# Patient Record
Sex: FEMALE | Race: WHITE | Employment: OTHER | URBAN - METROPOLITAN AREA
[De-identification: names, ages, dates, MRNs, and addresses within clinical notes are randomized per-mention and may not be internally consistent; named-entity substitution may affect disease eponyms.]

---

## 2017-03-09 ENCOUNTER — HOSPITAL ENCOUNTER (OUTPATIENT)
Dept: SURGERY | Age: 72
Discharge: HOME | End: 2017-03-09
Attending: OBSTETRICS & GYNECOLOGY

## 2017-03-09 VITALS
TEMPERATURE: 98.4 F | BODY MASS INDEX: 30.42 KG/M2 | DIASTOLIC BLOOD PRESSURE: 63 MMHG | RESPIRATION RATE: 16 BRPM | HEIGHT: 64 IN | HEART RATE: 64 BPM | SYSTOLIC BLOOD PRESSURE: 114 MMHG | WEIGHT: 178.2 LBS | OXYGEN SATURATION: 96 %

## 2017-03-09 PROBLEM — N39.3 SUI (STRESS URINARY INCONTINENCE, FEMALE): Status: ACTIVE | Noted: 2017-03-09

## 2017-03-09 PROBLEM — N36.42 INTRINSIC SPHINCTER DEFICIENCY (ISD): Status: ACTIVE | Noted: 2017-03-09

## 2017-03-09 RX ORDER — ASPIRIN 81 MG/1
81 TABLET ORAL
COMMUNITY

## 2017-03-09 NOTE — PERIOP NOTES
Patient verified name, , and surgery as listed in [facility]. Need orders. Type 1B surgery, PAT assessment complete. Labs per surgeon: no orders. Labs per anesthesia protocol: none. EKG:  None per anesthesia guidelines. Hibiclens and instructions given per hospital policy. Patient provided with handouts including Guide to Surgery, Pain Management, Hand Hygiene, Blood Transfusion Education, and [place] [facility] Brochure. Patient answered medical/surgical history questions at their best of ability. All prior to admission medications documented in [facility]. Original medication prescription bottle not visualized during patient appointment. Patient instructed to hold all vitamins 7 days prior to surgery and NSAIDS 5 days prior to surgery, patient verbalized understanding. Medications to be held : none. Patient instructed to continue previous medications as prescribed prior to surgery and to take the following medications the day of surgery according to anesthesia guidelines with a small sip of water: atenolol, omeprazole, venlafaxine. Patient taught back and verbalized understanding.

## 2017-03-14 ENCOUNTER — ANESTHESIA EVENT (OUTPATIENT)
Dept: SURGERY | Age: 72
End: 2017-03-14
Payer: MEDICARE

## 2017-03-15 ENCOUNTER — SURGERY (OUTPATIENT)
Age: 72
End: 2017-03-15

## 2017-03-15 ENCOUNTER — ANESTHESIA (OUTPATIENT)
Dept: SURGERY | Age: 72
End: 2017-03-15
Payer: MEDICARE

## 2017-03-15 PROCEDURE — 74011250636 HC RX REV CODE- 250/636: Performed by: OBSTETRICS & GYNECOLOGY

## 2017-03-15 PROCEDURE — 74011250636 HC RX REV CODE- 250/636

## 2017-03-15 RX ORDER — FENTANYL CITRATE 50 UG/ML
INJECTION, SOLUTION INTRAMUSCULAR; INTRAVENOUS AS NEEDED
Status: DISCONTINUED | OUTPATIENT
Start: 2017-03-15 | End: 2017-03-15 | Stop reason: HOSPADM

## 2017-03-15 RX ORDER — PROPOFOL 10 MG/ML
INJECTION, EMULSION INTRAVENOUS
Status: DISCONTINUED | OUTPATIENT
Start: 2017-03-15 | End: 2017-03-15 | Stop reason: HOSPADM

## 2017-03-15 RX ORDER — PROPOFOL 10 MG/ML
INJECTION, EMULSION INTRAVENOUS AS NEEDED
Status: DISCONTINUED | OUTPATIENT
Start: 2017-03-15 | End: 2017-03-15 | Stop reason: HOSPADM

## 2017-03-15 RX ADMIN — FENTANYL CITRATE 100 MCG: 50 INJECTION, SOLUTION INTRAMUSCULAR; INTRAVENOUS at 07:20

## 2017-03-15 RX ADMIN — PROPOFOL 50 MCG/KG/MIN: 10 INJECTION, EMULSION INTRAVENOUS at 07:36

## 2017-03-15 RX ADMIN — ROPIVACAINE HYDROCHLORIDE 20 ML: 5 INJECTION, SOLUTION EPIDURAL; INFILTRATION; PERINEURAL at 07:46

## 2017-03-15 RX ADMIN — CEFAZOLIN 2 G: 1 INJECTION, POWDER, FOR SOLUTION INTRAMUSCULAR; INTRAVENOUS; PARENTERAL at 07:20

## 2017-03-15 RX ADMIN — PROPOFOL 50 MG: 10 INJECTION, EMULSION INTRAVENOUS at 07:32

## 2017-03-15 RX ADMIN — EPINEPHRINE 0.15 MG: 1 INJECTION, SOLUTION INTRAMUSCULAR; SUBCUTANEOUS at 07:46

## 2017-03-15 RX ADMIN — LIDOCAINE HYDROCHLORIDE 30 ML: 10 INJECTION, SOLUTION INFILTRATION; PERINEURAL at 07:47

## 2017-03-15 NOTE — ANESTHESIA POSTPROCEDURE EVALUATION
Post-Anesthesia Evaluation and Assessment    Patient: Lola Guajardo MRN: [id number]  SSN: [id number]    YOB: 1945  Age: 73 y.o. Sex: female       Cardiovascular Function/Vital Signs  Visit Vitals    /64    Pulse (!) 59    Temp 36.4 °C (97.6 °F)    Resp 15    SpO2 96%       Patient is status post MAC anesthesia for Procedure(s):  SUBURETHRAL SLING ADVANTAGE FIT. Nausea/Vomiting: None    Postoperative hydration reviewed and adequate. Pain:  Pain Scale 1: Numeric (0 - 10) (03/15/17 0841)  Pain Intensity 1: 0 (03/15/17 0841)   Managed    Neurological Status:   Neuro (WDL): Within Defined Limits (03/15/17 0841)  Neuro  Neurologic State: Drowsy (03/15/17 0841)  Orientation Level: Oriented X4 (03/15/17 0841)  Speech: Clear (03/15/17 0841)  LUE Motor Response: Purposeful (03/15/17 0806)  LLE Motor Response: Purposeful (03/15/17 0806)  RUE Motor Response: Purposeful (03/15/17 0806)  RLE Motor Response: Purposeful (03/15/17 0806)   At baseline    Mental Status and Level of Consciousness: Arousable    Pulmonary Status:   O2 Device: Room air (03/15/17 0841)   Adequate oxygenation and airway patent    Complications related to anesthesia: None    Post-anesthesia assessment completed.  No concerns    Signed By: Phi Vincent MD     March 15, 2017

## 2017-03-15 NOTE — ANESTHESIA PREPROCEDURE EVALUATION
Anesthetic History     PONV          Review of Systems / Medical History  Patient summary reviewed and pertinent labs reviewed    Pulmonary                   Neuro/Psych              Cardiovascular    Hypertension: well controlled        Dysrhythmias : SVT      Exercise tolerance: >4 METS     GI/Hepatic/Renal     GERD           Endo/Other        Morbid obesity and arthritis     Other Findings              Physical Exam    Airway  Mallampati: II  TM Distance: 4 - 6 cm  Neck ROM: normal range of motion   Mouth opening: Diminished (comment)     Cardiovascular    Rhythm: regular           Dental  No notable dental hx       Pulmonary                 Abdominal  GI exam deferred       Other Findings            Anesthetic Plan    ASA: 2  Anesthesia type: general          Induction: Intravenous  Anesthetic plan and risks discussed with: Patient

## 2017-05-15 ENCOUNTER — ANESTHESIA EVENT (OUTPATIENT)
Dept: ENDOSCOPY | Age: 72
End: 2017-05-15
Payer: MEDICARE

## 2017-05-15 RX ORDER — SODIUM CHLORIDE 0.9 % (FLUSH) 0.9 %
5-10 SYRINGE (ML) INJECTION AS NEEDED
Status: CANCELLED | OUTPATIENT
Start: 2017-05-15

## 2017-05-15 RX ORDER — SODIUM CHLORIDE, SODIUM LACTATE, POTASSIUM CHLORIDE, CALCIUM CHLORIDE 600; 310; 30; 20 MG/100ML; MG/100ML; MG/100ML; MG/100ML
100 INJECTION, SOLUTION INTRAVENOUS CONTINUOUS
Status: CANCELLED | OUTPATIENT
Start: 2017-05-15

## 2017-05-16 ENCOUNTER — HOSPITAL ENCOUNTER (OUTPATIENT)
Age: 72
Setting detail: OUTPATIENT SURGERY
Discharge: HOME | End: 2017-05-16
Attending: COLON & RECTAL SURGERY | Admitting: COLON & RECTAL SURGERY
Payer: MEDICARE

## 2017-05-16 ENCOUNTER — ANESTHESIA (OUTPATIENT)
Dept: ENDOSCOPY | Age: 72
End: 2017-05-16
Payer: MEDICARE

## 2017-05-16 VITALS
TEMPERATURE: 96.8 F | OXYGEN SATURATION: 96 % | DIASTOLIC BLOOD PRESSURE: 75 MMHG | RESPIRATION RATE: 18 BRPM | HEIGHT: 64 IN | SYSTOLIC BLOOD PRESSURE: 114 MMHG | BODY MASS INDEX: 29.88 KG/M2 | WEIGHT: 175 LBS | HEART RATE: 50 BPM

## 2017-05-16 PROCEDURE — 76040000025: Performed by: COLON & RECTAL SURGERY

## 2017-05-16 PROCEDURE — 74011000250 HC RX REV CODE- 250

## 2017-05-16 PROCEDURE — 76060000032 HC ANESTHESIA 0.5 TO 1 HR: Performed by: COLON & RECTAL SURGERY

## 2017-05-16 PROCEDURE — 74011250636 HC RX REV CODE- 250/636

## 2017-05-16 PROCEDURE — 74011250636 HC RX REV CODE- 250/636: Performed by: ANESTHESIOLOGY

## 2017-05-16 RX ORDER — PROPOFOL 10 MG/ML
INJECTION, EMULSION INTRAVENOUS AS NEEDED
Status: DISCONTINUED | OUTPATIENT
Start: 2017-05-16 | End: 2017-05-16 | Stop reason: HOSPADM

## 2017-05-16 RX ORDER — PROPOFOL 10 MG/ML
INJECTION, EMULSION INTRAVENOUS
Status: DISCONTINUED | OUTPATIENT
Start: 2017-05-16 | End: 2017-05-16 | Stop reason: HOSPADM

## 2017-05-16 RX ORDER — SODIUM CHLORIDE, SODIUM LACTATE, POTASSIUM CHLORIDE, CALCIUM CHLORIDE 600; 310; 30; 20 MG/100ML; MG/100ML; MG/100ML; MG/100ML
100 INJECTION, SOLUTION INTRAVENOUS CONTINUOUS
Status: DISCONTINUED | OUTPATIENT
Start: 2017-05-16 | End: 2017-05-16 | Stop reason: HOSPADM

## 2017-05-16 RX ORDER — LIDOCAINE HYDROCHLORIDE 20 MG/ML
INJECTION, SOLUTION EPIDURAL; INFILTRATION; INTRACAUDAL; PERINEURAL AS NEEDED
Status: DISCONTINUED | OUTPATIENT
Start: 2017-05-16 | End: 2017-05-16 | Stop reason: HOSPADM

## 2017-05-16 RX ADMIN — LIDOCAINE HYDROCHLORIDE 40 MG: 20 INJECTION, SOLUTION EPIDURAL; INFILTRATION; INTRACAUDAL; PERINEURAL at 12:12

## 2017-05-16 RX ADMIN — PROPOFOL 160 MCG/KG/MIN: 10 INJECTION, EMULSION INTRAVENOUS at 12:12

## 2017-05-16 RX ADMIN — PROPOFOL 50 MG: 10 INJECTION, EMULSION INTRAVENOUS at 12:12

## 2017-05-16 RX ADMIN — SODIUM CHLORIDE, SODIUM LACTATE, POTASSIUM CHLORIDE, AND CALCIUM CHLORIDE 100 ML/HR: 600; 310; 30; 20 INJECTION, SOLUTION INTRAVENOUS at 11:25

## 2017-05-16 NOTE — ROUTINE PROCESS
Patient discharged via wheelchair. VSS on room air. No complaints of pain or discomfort. Spoke with Dr. Alber Joy at bedside. Anesthesia aware of discharge. Discharge instructions given to patient and family.

## 2017-05-16 NOTE — PROCEDURES
Colonoscopy Procedure Note    Wilhemina Castleman  1945  [id number]    Indications:  Screening colonoscopy, Personal history of colon polyps     Pre-operative Diagnosis: Screening colonoscopy, Personal history of colon polyps    Post-operative Diagnosis: Diverticulosis, rectocele    Surgeon: Riri Ann MD    Referring Provider: James Mccray MD    Sedation:  MAC anesthesia Propofol    Pre-Procedure Exam:  Airway: clear   Heart: normal S1and S2    Lungs: clear bilateral  Abdomen: soft, nontender, bowel sounds present and normal in all quadrants   Mental Status: awake, alert, and oriented to person, place, and time    Procedure in Detail:  Informed consent was obtained for the procedure after delineating the risks, benefits, and alternatives to the procedure. Specific risks of perforation (1/1000), hemorrhage (1/1000), missed lesions, adverse drug reaction, and aspiration were discussed. The patient was placed in the left lateral decubitus position. Based on the pre-procedure assessment, including review of the patient's medical history, medications, and allergies, moderate sedation was felt to be appropriate. The aforementioned medications were given in an incremental fashion to achieve adequate sedation. The patient was monitored continuously with ECG tracing, pulse oximetry, blood pressure monitoring, and direct observations. A rectal examination was performed and showed no external hemorrhoids. Small rectocele. The Dharmesh Do Arenque 1634 was inserted per anus and advanced under direct vision to the cecum, which was identified by the ileocecal valve and appendiceal orifice. The quality of the colonic preparation was good. The colonoscope was slowly withdrawn using a to-and-fro and circumferential motion over 15 minutes with careful examination between folds. Retroflexion was performed in the rectum. Appropriate photodocumentation was obtained.      Findings:   Rectum: Mild incidental internal hemorrhoids. No polyps, masses, bleeding, or mucosal abnormalities. Sigmoid: Rare incidental diverticulosis. No polyps, masses, bleeding, or mucosal abnormalities. Descending Colon: Normal.  No polyps, masses, bleeding, or mucosal abnormalities. Transverse Colon: Normal. No polyps, masses, bleeding, or mucosal abnormalities. Ascending Colon: Normal.  No polyps, masses, bleeding, or mucosal abnormalities. Cecum: Normal.  No polyps, masses, bleeding, or mucosal abnormalities. Terminal Ileum: not intubated    Therapies:  none    Implants: None    Specimen:  none    Complications: None; patient tolerated the procedure well. EBL:  0    Recommendations:   -Repeat colonoscopy in 5 years due to history of polyps.   -High fiber diet.    -Follow up with Dr Carly Tineo in the office on an as-needed basis    Jose Mireles MD  5/16/2017  12:42 PM

## 2017-05-16 NOTE — DISCHARGE INSTRUCTIONS
Gastrointestinal Colonoscopy/Flexible Sigmoidoscopy - Lower Exam Discharge Instructions  1. Call Dr. Rochelle Armenta at [phone] for any problems or questions. 2. Contact the doctors office for follow up appointment as directed  3. Medication may cause drowsiness for several hours, therefore, do not drive or operate machinery for remainder of the day. 4. No alcohol today. 5. Ordinarily, you may resume regular diet and activity after exam unless otherwise specified by your physician. 6. Because of air put into your colon during exam, you may experience some abdominal distension, relieved by the passage of gas, for several hours. 7. Contact your physician if you have any of the following:  a. Excessive amount of bleeding - large amount when having a bowel movement. Occasional specks of blood with bowel movement would not be unusual.  b. Severe abdominal pain  c. Fever or Chills    Any additional instructions:    -Follow up with Dr Rochelle Armenta in the office on an as-needed basis. -High fiber diet  -Repeat colonoscopy in 5 years due to history of polyps      Instructions given to Valerie Gaspar and other family members.   Instructions given by: Susu Gaona MD

## 2017-05-16 NOTE — ANESTHESIA POSTPROCEDURE EVALUATION
Post-Anesthesia Evaluation and Assessment    Patient: Kenisha Becker MRN: [id number]  SSN: [id number]    YOB: 1945  Age: 73 y.o. Sex: female       Cardiovascular Function/Vital Signs  Visit Vitals    /75    Pulse (!) 50    Temp 36 °C (96.8 °F)    Resp 18    Ht 5' 4\" (1.626 m)    Wt 79.4 kg (175 lb)    SpO2 96%    Breastfeeding No    BMI 30.04 kg/m2       Patient is status post total IV anesthesia anesthesia for Procedure(s):  COLONOSCOPY / BMI= 31. Nausea/Vomiting: None    Postoperative hydration reviewed and adequate. Pain:  Pain Scale 1: Numeric (0 - 10) (05/16/17 1314)  Pain Intensity 1: 0 (05/16/17 1314)   Managed    Neurological Status: At baseline    Mental Status and Level of Consciousness: Awake, alert    Pulmonary Status:   O2 Device: Room air (05/16/17 1314)   Adequate oxygenation and airway patent    Complications related to anesthesia: None    Post-anesthesia assessment completed.  No concerns    Signed By: Kashif Golden MD     May 16, 2017

## 2017-05-16 NOTE — IP AVS SNAPSHOT
[address] 
[phone] Patient: Brooke Kelly MRN: [id number] HPC:[id number]/50/[id number] You are allergic to the following Allergen Reactions Cortisone Other (comments) Per Pt, develops flushing after taking cortisone. Recent Documentation Height Weight Breastfeeding? BMI OB Status Smoking Status 1.626 m 79.4 kg No 30.04 kg/m2 Hysterectomy Former Smoker Emergency Contacts Name Discharge Info Relation Home Work Mobile [facility] DISCHAR[facility]AREGIVER [3] Spouse [3] [phone] About your hospitalization You were admitted on:  May 16, 2017 You last received care in the:  [facility] You were discharged on:  May 16, 2017 Unit phone number:  [phone] Why you were hospitalized Your primary diagnosis was:  Not on File Providers Seen During Your Hospitalizations Provider Role Specialty Primary office phone Geo Izaguirre MD Attending Provider Colon and Rectal Surgery [phone] Your Primary Care Physician (PCP) Primary Care Physician Office Phone Office Fax [facility], [address] [place] [address] [phone] Follow-up Information Follow up With Details Comments Contact Info Gilda Schulz MD   [address] [place] [id number] [phone] Current Discharge Medication List  
  
CONTINUE these medications which have CHANGED Dose & Instructions Dispensing Information Comments Morning Noon Evening Bedtime  
 atenolol 25 mg tablet Commonly known as:  TENORMIN What changed:  when to take this Your last dose was: Your next dose is:    
   
   
 Dose:  25 mg Take 1 Tab by mouth daily. Indications: PAROXYSMAL SUPRAVENTRICULAR TACHYCARDIA, SVT Quantity:  90 Tab Refills:  3 Omeprazole delayed release 20 mg tablet Commonly known as:  PRILOSEC D/R What changed:  when to take this Your last dose was: Your next dose is:    
   
   
 Dose:  20 mg Take 1 Tab by mouth daily. Quantity:  90 Tab Refills:  3  
     
   
   
   
  
 venlafaxine 50 mg tablet Commonly known as:  [facility] What changed:  when to take this Your last dose was: Your next dose is:    
   
   
 Dose:  50 mg Take 1 Tab by mouth daily. Indications: GENERALIZED ANXIETY DISORDER Quantity:  90 Tab Refills:  3 CONTINUE these medications which have NOT CHANGED Dose & Instructions Dispensing Information Comments Morning Noon Evening Bedtime  
 aspirin delayed-release 81 mg tablet Your last dose was: Your next dose is:    
   
   
 Dose:  81 mg Take 81 mg by mouth every morning. Refills:  0 Discharge Instructions Gastrointestinal Colonoscopy/Flexible Sigmoidoscopy - Lower Exam Discharge Instructions 1. Call Dr. Thu Stern at [phone] for any problems or questions. 2. Contact the doctors office for follow up appointment as directed 3. Medication may cause drowsiness for several hours, therefore, do not drive or operate machinery for remainder of the day. 4. No alcohol today. 5. Ordinarily, you may resume regular diet and activity after exam unless otherwise specified by your physician. 6. Because of air put into your colon during exam, you may experience some abdominal distension, relieved by the passage of gas, for several hours. 7. Contact your physician if you have any of the following: 
a. Excessive amount of bleeding  large amount when having a bowel movement. Occasional specks of blood with bowel movement would not be unusual. 
b. Severe abdominal pain 
c. Fever or Chills Any additional instructions:   
-Follow up with Dr Thu Stern in the office on an as-needed basis. -High fiber diet -Repeat colonoscopy in 5 years due to history of polyps Instructions given to Pamella Courser and other family members. Instructions given by: Shabana Barnes MD 
 
 
 
 
Discharge Orders None ACO Transitions of Care Introducing [employer] [address] offers a voluntary care coordination program to provide high quality service and care to [facility] fee-for-service beneficiaries. Vicente Delgado was designed to help you enhance your health and well-being through the following services: ? Transitions of Care  support for individuals who are transitioning from one care setting to another (example: Hospital to home). ? Chronic and Complex Care Coordination  support for individuals and caregivers of those with serious or chronic illnesses or with more than one chronic (ongoing) condition and those who take a number of different medications. If you meet specific medical criteria, a [facility] [address] may call you directly to coordinate your care with your primary care physician and your other care providers. For questions about the [facility] programs, please, contact your physicians office. For general questions or additional information about Accountable Care Organizations: 
Please visit www.medicare.gov/acos. html or call 1-800-MEDICARE (3-876.566.4490) TTY users should call 3-197.290.2734. Introducing hospitals & HEALTH SERVICES! Henrry Gonzalez introduces [employer] patient portal. Now you can access parts of your medical record, email your doctor's office, and request medication refills online. 1. In your internet browser, go to [email or web address] 2. Click on the First Time User? Click Here link in the Sign In box. You will see the New Member Sign Up page. 3. Enter your [employer] Access Code exactly as it appears below.  You will not need to use this code after youve completed the sign-up process. If you do not sign up before the expiration date, you must request a new code. · [employer] Access Code: [email or web address] Expires: 6/4/2017 10:11 AM 
 
4. Enter the last four digits of your Social Security Number (xxxx) and Date of Birth (mm/dd/yyyy) as indicated and click Submit. You will be taken to the next sign-up page. 5. Create a [employer] ID. This will be your [employer] login ID and cannot be changed, so think of one that is secure and easy to remember. 6. Create a [employer] password. You can change your password at any time. 7. Enter your Password Reset Question and Answer. This can be used at a later time if you forget your password. 8. Enter your e-mail address. You will receive e-mail notification when new information is available in [address] 9. Click Sign Up. You can now view and download portions of your medical record. 10. Click the Download Summary menu link to download a portable copy of your medical information. If you have questions, please visit the Frequently Asked Questions section of the [employer] website. Remember, [employer] is NOT to be used for urgent needs. For medical emergencies, dial 911. Now available from your iPhone and Android! General Information Please provide this summary of care documentation to your next provider. Patient Signature:  ____________________________________________________________ Date:  ____________________________________________________________  
  
Jericho Rucker Provider Signature:  ____________________________________________________________ Date:  ____________________________________________________________

## 2017-05-16 NOTE — ANESTHESIA PREPROCEDURE EVALUATION
Anesthetic History     PONV          Review of Systems / Medical History  Patient summary reviewed and pertinent labs reviewed    Pulmonary                   Neuro/Psych              Cardiovascular    Hypertension: well controlled        Dysrhythmias : SVT      Exercise tolerance: >4 METS     GI/Hepatic/Renal     GERD           Endo/Other        Morbid obesity and arthritis     Other Findings              Physical Exam    Airway  Mallampati: II  TM Distance: 4 - 6 cm  Neck ROM: normal range of motion   Mouth opening: Diminished (comment)     Cardiovascular    Rhythm: regular  Rate: normal         Dental  No notable dental hx       Pulmonary                 Abdominal  GI exam deferred       Other Findings            Anesthetic Plan    ASA: 2  Anesthesia type: total IV anesthesia          Induction: Intravenous  Anesthetic plan and risks discussed with: Patient

## 2018-02-21 PROBLEM — R73.01 IFG (IMPAIRED FASTING GLUCOSE): Status: ACTIVE | Noted: 2018-02-21

## 2018-03-06 PROBLEM — E78.5 HYPERLIPIDEMIA LDL GOAL <100: Status: ACTIVE | Noted: 2018-03-06

## 2018-03-09 ENCOUNTER — HOSPITAL ENCOUNTER (OUTPATIENT)
Dept: MAMMOGRAPHY | Age: 73
Discharge: HOME | End: 2018-03-09
Attending: FAMILY MEDICINE
Payer: MEDICARE

## 2018-03-09 DIAGNOSIS — Z78.0 MENOPAUSE: ICD-10-CM

## 2018-03-09 PROCEDURE — 77080 DXA BONE DENSITY AXIAL: CPT

## 2018-03-12 PROBLEM — M85.80 OSTEOPENIA DETERMINED BY X-RAY: Status: ACTIVE | Noted: 2018-03-12

## 2018-03-12 NOTE — PROGRESS NOTES
Dear Mrs. Christa Bennett     Your recent Dexa scan show Osteopenia. Recomendations are :  -OTC Vitamin D 1000 international units daily and continue your weekly Rx. of Vitamin D3 if you were previously on.  -Daily oral intake of calcium : 600 mg to  u1200 mg day. - Aerobic exercise with goal of 150 minutes a week  - Twice a week weight bearing exercises  -Eat healthy with 3-5 servings daily of fruit and vegetables and low fat or fat free products. Follow up next year for your preventive physical exam and repeat Dexa scan in 2 years.     Thanks,  Dr. Kolton Gonzalez

## 2019-05-20 PROBLEM — Z00.00 ANNUAL PHYSICAL EXAM: Status: ACTIVE | Noted: 2019-05-20

## 2020-05-21 ENCOUNTER — HOSPITAL ENCOUNTER (OUTPATIENT)
Dept: PHYSICAL THERAPY | Age: 75
Discharge: HOME | End: 2020-05-21
Attending: OBSTETRICS & GYNECOLOGY
Payer: MEDICARE

## 2020-05-21 DIAGNOSIS — N39.3 SUI (STRESS URINARY INCONTINENCE, FEMALE): ICD-10-CM

## 2020-05-21 PROCEDURE — 97110 THERAPEUTIC EXERCISES: CPT

## 2020-05-21 PROCEDURE — 97161 PT EVAL LOW COMPLEX 20 MIN: CPT

## 2020-05-21 NOTE — THERAPY EVALUATION
Harley Pop : 1945 [place] Secondary: David Aguilar at [facility] [address], [place] [address] [phone]   [phone] OUTPATIENT PHYSICAL THERAPY:Initial Assessment 2020 ICD-10: Treatment Diagnosis: N39.3 Stress incontinence (female) (male), R27.8 Lack of coordination (muscle incoordination), N39.41 Urge incontinence, R35.0 Frequency of micturition, M62.81 Generalized weakness Precautions/Allergies:  
Cortisone TREATMENT PLAN: 
Effective Dates: 2020 TO 2020 (90 days). Frequency/Duration: 1 time a week for 90 Day(s) MEDICAL/REFERRING DIAGNOSIS: 
HALEY (stress urinary incontinence, female) [N39.3] DATE OF ONSET: chronic REFERRING PHYSICIAN: Jon Severe, DO 
MD Orders: Evaluate and treat Return MD Appointment: 8/3/2020 INITIAL ASSESSMENT:  Ms. Alicia Conner presents with pelvic floor muscle (PFM) lack of coordination, timing, awareness, endurance and strength. She is unable to pair a PFM contraction with normal breathing and without utilizing accessory muscles, contributing to stress urinary incontinence (HALEY). She also has some mild bladder health habits that may be worsening her symptoms and contributing to urinary urgency and frequency. She will benefit from skilled PT with an emphasis on PFM retraining, core retraining, body mechanics and bladder health education to improve her daytime UI. PROBLEM LIST (Impacting functional limitations): 1. Decreased Strength 2. Decreased ADL/Functional Activities 3. Decreased [place] with Home Exercise Program 
4. Decreased strength of pelvic floor which limits bladder control INTERVENTIONS PLANNED: (Treatment may consist of any combination of the following) 1. Neuromuscular re-education 2. Biofeedback as needed 3. HEP 4. Bladder retraining 5. Bladder education 6. Home Exercise Program (HEP) 7. Manual Therapy 8. Neuromuscular Re-education/Strengthening 9. Therapeutic Activites 10. Therapeutic Exercise/Strengthening GOALS: (Goals have been discussed and agreed upon with patient.) Short-Term Functional Goals: Time Frame: 6 weeks 1. Pt will demonstrate I with basic PFM HEP to improve awareness, coordination, and timing of PFM. 2. Pt will be able to isolated PFM contraction without compensation. 3. Pt will increase water intake by 16 oz and decrease irritant consumption by 8 oz to improve bladder health. Discharge Goals: Time Frame: 12 weeks 1. Pt will demonstrate appropriate use of the pelvic floor muscle group (quick flicks and/or drops), without compensation, to implement urge suppression appropriately with urgency of urination and decrease the number of pads used per day. 2. Pt will be able to demonstrate appropriate use of 'the knack' in order to improve PFM coordination in order to reduce/eliminate stress urinary incontinence during coughing, laughing and sneezing. 3. Pt will demonstrate appropriate co-contraction of the transversus abdominus and pelvic floor muscle group, and maintain 8 seconds to improve core stability and improve management of UI during daily tasks. OUTCOME MEASURE:  
Tool Used: Pelvic Floor Impact Questionnaireshort form 7 (PFIQ-7) Score:  Initial: · Bladder or Urine: 24 · Bowel or Rectum: 19 · Vagina or Pelvis: 14 Most Recent: X (Date: -- ) · Bladder or Urine: X 
· Bowel or Rectum: X · Vagina or Pelvis: X Interpretation of Score: Each of the 7 sections is scored on a scale from 0-3; 0 representing \"Not at all\", 3 representing \"Quite a bit\". The mean value is taken from all the answered items, then multiplied by 100 to obtain the scale score, ranging from 0-100. This process is repeated for each column representing bowel, bladder, and pelvic pain.  
 
MEDICAL NECESSITY:  
· Patient is expected to demonstrate progress in strength, range of motion, coordination and functional technique to decrease urinary leakage. REASON FOR SERVICES/OTHER COMMENTS: 
· Patient continues to require skilled intervention due to above mentioned deficits. Total Duration: PT Patient Time In/Time Out Time In: 1300 Time Out: 1400 Rehabilitation Potential For Stated Goals: Good Regarding Aruna Sibley therapy, I certify that the treatment plan above will be carried out by a therapist or under their direction. Thank you for this referral, Mary Jo Gloria, PT, DPT     
 
PAIN/SUBJECTIVE:  
Initial: Pain Intensity 1: 0  Post Session:  0/10 HISTORY:  
History of Injury/Illness (Reason for Referral): Ms. Maurice Arteaga is a 70 yo female referred to pelvic floor physical therapy (PFPT) by Kayode Alves, DO 2/2 stress urinary incontinence. Pt reports that symptoms are chronic and this has been going on for many years, however has worsened more recently. She has been told about kegels, but states that she doesn't remember to do them. She has had 2-3 UTIs over the last year. Previous treatment includes surgery and medications. Urinary: Frequency 8-10 x/day, 0-1 x/night. Positive for stress urinary incontinence, urge urinary incontinence and urinary frequency. Negative for urinary urgency, incomplete emptying, urinary hesitancy/intermittency, dysuria, hematuria and nocturia. Pt does use pads for protection; 1-2 pads per day (PPD). Fluid intake: 8-16 oz water/day; bladder irritants include: 2c coffee, 16-20oz diet coke, alcohol. Pt does not limit fluid intake due to bladder control. Bowel: Frequency 1-2x/day. Negative for pain with bowel movement, pushing/straining with bowel movement, fecal incontinence, constipation and incomplete emptying. Pt does not use pads for protection; 0 pads per day (PPD). [place] stool type: 4 and 5. Use of stool softeners or laxatives? NO Sexual: Pt is not sexually active due to husbands health. Pelvic Organ Prolapse/Pelvic Pain: Denies pain complaints. Denies pelvic pressure/heaviness. OB History: Pregnancies: 3  Vaginal delivery: 3 Past Medical History/Comorbidities:  
Ms. Renny Arreola  has a past medical history of Anxiety, Arthritis, Cancer ([facility] [address] Depression with anxiety, Diverticulosis (2017), Former smoker, GERD (gastroesophageal reflux disease), H/O bone density study (2015), H/O colonoscopy (3/15/11), History of breast cancer (1995), colonic polyps, Hyperlipidemia LDL goal <100 (3/6/2018), Hypertension, IFG (impaired fasting glucose) (2/21/2018), Nausea & vomiting, Osteopenia determined by x-ray (3/12/2018), SVT (supraventricular tachycardia) ([facility] [address] and Vitamin D deficiency (12/5/2016). She also has no past medical history of Adverse effect of anesthesia, Difficult intubation, Malignant hyperthermia due to anesthesia, or Pseudocholinesterase deficiency. Ms. Renny Arreola  has a past surgical history that includes hx skin biopsy (2016); hx cholecystectomy (2012); hx tubal ligation (1983); hx knee replacement (Bilateral, 2009 (R), 2014 (L)); hx mastectomy (Bilateral, 1995); hx [place]rological [place] [facility] pr colonoscopy flx dx w/collj spec when pfrmd (05/16/2017); colonoscopy (N/A, 5/16/2017); and hx total vaginal hysterectomy (2000). Social History/Living Environment:  
Pt lives with her . Alcohol use? Ocassional 
Tobacco use? No 
Prior Level of Function/Work/Activity: 
Prior level of function: independent with urinary leakage Occupation: Retired Exercises activities: golf 2x/week Ambulatory/Rehab Services H2 Model Falls Risk Assessment Risk Factors: 
     No Risk Factors Identified Ability to Rise from Chair: 
     (0)  Ability to rise in a single movement Falls Prevention Plan: No modifications necessary Total: (5 or greater = High Risk): 0  
©2010 [employer] States Patent #[id number]. Federal Law prohibits the replication, distribution or use without written permission from [facility] Current Medications:  
  
Current Outpatient Medications:  
  calcium acetate,phosphat bind, (PHOSLO) 667 mg cap, Take 1 Cap by mouth three (3) times daily (with meals). , Disp: , Rfl:  
  cholecalciferol (Vitamin D3) 25 mcg (1,000 unit) cap, Take 1,000 Units by mouth daily. , Disp: , Rfl:  
  venlafaxine (EFFEXOR) 50 mg tablet, Take 1 Tab by mouth every morning. Indications: repeated episodes of anxiety, Disp: 90 Tab, Rfl: 3 
  metFORMIN (GLUCOPHAGE) 500 mg tablet, Take 0.5 Tabs by mouth daily (with dinner). Indications: prevention of type 2 diabetes mellitus, Disp: 90 Tab, Rfl: 3 
  atenolol (TENORMIN) 25 mg tablet, Take 1 Tab by mouth daily. Indications: Paroxysmal Supraventricular Tachycardia, SVT, Disp: 90 Tab, Rfl: 3 
  Omeprazole delayed release (PRILOSEC D/R) 20 mg tablet, Take 1 Tab by mouth daily. Indications: gastroesophageal reflux disease, Disp: 90 Tab, Rfl: 3 
  aspirin delayed-release 81 mg tablet, Take 81 mg by mouth every morning., Disp: , Rfl:   
Date Last Reviewed: 5/21/2020 Number of Personal Factors/Comorbidities that affect the Plan of Care: 0: LOW COMPLEXITY EXAMINATION:  
External Observations: ? Voluntary contraction: [] absent     [x] present ? Involuntary contraction: [x] absent     [] present ? Involuntary relaxation: [x] absent     [] present ? Perineal descent at rest: [x] absent     [] present ? Perineal descent with bearing: [x] absent     [] present 
? Skin integrity: atrophy present Pelvic Floor Muscle (PFM) Assessment: ? Vaginal vault size: [] decreased     [] increased     [x] WNL ? Muscle volume: [x] decreased     [] WNL     [] Defect ? PFM tone: [x] decreased     [] increased     [] WNL Contraction ability: 
Voluntary contraction: [] absent     [x] weak     [] moderate      [] strong Voluntary relaxation: [] absent     [x] partial     [] complete MMT: 2/5 PFM endurance: 5 seconds Tissue laxity test: Anterior wall: [] minimum     [x] moderate     [] severe      [] WNL Posterior wall: [x] minimum     [] moderate     [] severe      [] WNL Palpation: via vaginal canal 
Superficial Pelvic Floor Musculature (PFM): Tender? Intermediate PFM Tender? Deep PFM Tender? R Superficial Transverse Perineal No R Deep Transverse Perineal No R. Puborectalis No  
L Superficial Transverse Perineal No L Deep Transverse Perineal No L. Puborectalis No  
R Ischiocavernosus No R Compressor Urethra No R. Pubococcygeus No  
L Ischiocavernosus No L Compressor Urethra No L. Pubococcygeus No  
R Bulbocavernosus No   R. Ileococcygeus No  
L Bulbocavernosus No   L. Ileococcygeus No  
    R. Obturator Internus Not tested L. Obturator Internus Not tested R. Coccygeus Not tested L. Coccygeus Not tested Body Structures Involved: 1. Nerves 2. Muscles Body Functions Affected: 1. Genitourinary 2. Neuromusculoskeletal 
3. Movement Related Activities and Participation Affected: 1. Learning and Applying Knowledge 2. General Tasks and Demands 3. Mobility 4. Self Care Number of elements (examined above) that affect the Plan of Care: 1-2: LOW COMPLEXITY CLINICAL PRESENTATION:  
Presentation: Stable and uncomplicated: LOW COMPLEXITY CLINICAL DECISION MAKING:  
Use of outcome tool(s) and clinical judgement create a POC that gives a: Clear prediction of patient's progress: LOW COMPLEXITY

## 2020-05-21 NOTE — PROGRESS NOTES
Sameer Sullivan  : 1945  [place]  Secondary: David Aguilar at [facility], [address], [place] [address]  [phone]   [phone]      OUTPATIENT PHYSICAL THERAPY: Daily Treatment Note 2020   Visit Count:  1  ICD-10: Treatment Diagnosis: N39.3 Stress incontinence (female) (male), R27.8 Lack of coordination (muscle incoordination), N39.41 Urge incontinence, R35.0 Frequency of micturition, M62.81 Generalized weakness  Precautions/Allergies:   Cortisone   TREATMENT PLAN:  Effective Dates: 2020 TO 2020 (90 days). Frequency/Duration: 1 time a week for 90 Day(s) MEDICAL/REFERRING DIAGNOSIS:  HALEY (stress urinary incontinence, female) [N39.3]   DATE OF ONSET: chronic  REFERRING PHYSICIAN: Eli Redd DO  MD Orders: Evaluate and treat  Return MD Appointment: 8/3/2020     Pre-treatment Symptoms/Complaints: Urinary incontinence   Pain: Initial: Pain Intensity 1: 0  Post Session:  0/10   Medications Last Reviewed:  2020  Updated Objective Findings:  See evaluation note from today   TREATMENT:   THERAPEUTIC EXERCISE: (10 minutes):  Exercises per grid below to improve strength and coordination. Required maximal verbal and tactile cues to promote proper body breathing techniques and isolation of contraction. Progressed repetitions as indicated. TE= therapeutic exercise, TA= therapeutic activity, MT= manual therapy, NE= neuro re-education   Date:  20 Date:   Date:     Activity/Exercise Parameters Parameters Parameters   PFM coordination/strength 10 minutes                                           Pt gives verbal consent to internal vaginal assessment/treatment without chaperon present. Treatment/Session Summary:    · Response to Treatment:  Pt reports good understanding of plan of care, as well as prescribed home exercise program.  All questions were answered to pt's satisfaction.  Pt was invited to call with any further questions or concerns. · Communication/Consultation:  None today  · Equipment provided today:  None today  · Recommendations/Intent for next treatment session: Next visit will focus on coordination of kegel- biofeedback, bladder health.   Total Treatment Billable Duration: Evaluation 40 minutes, treatment 10 minutes  PT Patient Time In/Time Out  Time In: 1300  Time Out: 503 [address] Nix, PT, DPT    Future Appointments   Date Time Provider Karthikeyan Oreilly   6/4/2020  2:00 PM Dennie Saras, PT, DPT [facility]   6/11/2020  2:00 PM [facility]   6/18/2020  2:00 PM Blanca Gray, PT, DPT [facility]   6/25/2020  2:00 PM [facility]   7/2/2020  2:00 PM [facility]enn[facility] Saras[facility]   7/9/2020  2:00 PM Dennie Saras, PT, DPT [facility]   8/3/2020  2:45 PM Candida Song, Jerry Gonzalez

## 2020-06-04 ENCOUNTER — HOSPITAL ENCOUNTER (OUTPATIENT)
Dept: PHYSICAL THERAPY | Age: 75
Discharge: HOME | End: 2020-06-04
Attending: OBSTETRICS & GYNECOLOGY
Payer: MEDICARE

## 2020-06-04 PROCEDURE — 97530 THERAPEUTIC ACTIVITIES: CPT

## 2020-06-04 PROCEDURE — 97110 THERAPEUTIC EXERCISES: CPT

## 2020-06-04 NOTE — PROGRESS NOTES
Kimmie Henderson  : 1945  [place]  Secondary: [facility], [address], [place]  [phone]   [phone]      OUTPATIENT PHYSICAL THERAPY: Daily Treatment Note 2020   Visit Count:  2  ICD-10: Treatment Diagnosis: N39.3 Stress incontinence (female) (male), R27.8 Lack of coordination (muscle incoordination), N39.41 Urge incontinence, R35.0 Frequency of micturition, M62.81 Generalized weakness  Precautions/Allergies:   Cortisone   TREATMENT PLAN:  Effective Dates: 2020 TO 2020 (90 days). Frequency/Duration: 1 time a week for 90 Day(s) MEDICAL/REFERRING DIAGNOSIS:  HALEY (stress urinary incontinence, female) [N39.3]   DATE OF ONSET: chronic  REFERRING PHYSICIAN: Keith Lala DO MD Orders: Evaluate and treat  Return MD Appointment: 8/3/2020     Pre-treatment Symptoms/Complaints: Pt reports doing HEP \"a couple of time\" every other day. Finds them easier to do sitting down than standing. Leakage has been \"not too bad, about the same\". Pain: Initial: Pain Intensity 1: 0  Post Session:  0/10   Medications Last Reviewed:  2020  Updated Objective Findings:  None Today   TREATMENT:   THERAPEUTIC ACTIVITY: ( 25 minutes): Therapeutic activities per grid below to improve coordination and understanding of role of bladder health in urgency. Required moderate verbal cues to promote understanding. THERAPEUTIC EXERCISE: (20 minutes):  Exercises per grid below to improve strength and coordination. Required moderate visual, verbal and tactile cues to promote proper body breathing techniques and reduce accessory mm use. Progressed range and repetitions as indicated.   TE= therapeutic exercise, TA= therapeutic activity, MT= manual therapy, NE= neuro re-education   Date:  20 Date:  20 Date:     Activity/Exercise Parameters Parameters Parameters   PFM coordination/strength 10 minutes Holds and QF w/ sEMG; 4H/4R- 20 minutes    Urge suppression  15 minutes    Bladder health  10 minutes                              Pt gives verbal consent to internal vaginal assessment/treatment without chaperon present. Treatment/Session Summary:    · Response to Treatment:  Pt demonstrates good activation and relaxation of PFM with sEMG. She does continue to utilize abdominals mildly during recruitment with breath holding present during endurance contractions. This did improve with practice and decreasing hold time. Coordination of quick contraction was good and therefore patient was instructed in use of urge suppression with quick contraction. She was recommended to practice daily and implement with urinary urgency in order to improve urge control and frequency of urination. She demonstrate poor bladder health, likely contributing to urge symptoms. She was education on bladder health and how this can contribute to urgency and incontinence. She verbalizes understanding but poor motivation. · Communication/Consultation:  None today  · Equipment provided today:  None today  · Recommendations/Intent for next treatment session: Next visit will focus on coordination of kegel + movement. Check in with urge suppression.   Total Treatment Billable Duration: 20 minutes TE, 25 minutes TA  PT Patient Time In/Time Out  Time In: 1410  Time Out: 200 Oliver Gonzalez, PT, DPT    Future Appointments   Date Time Provider Karthikeyan Oreilly   6/11/2020  2:00 PM Yobani Arambula, PT, DPT [facility]   6/18/2020  2:00 PM Yobani Arambula, PT, DPT [facility]   6/25/2020  2:00 PM Lamont Gray PT, [facility]   8/3/2020  2:45 PM Sruthi Harden, Jerry E [address] [place]

## 2020-06-11 ENCOUNTER — HOSPITAL ENCOUNTER (OUTPATIENT)
Dept: PHYSICAL THERAPY | Age: 75
Discharge: HOME | End: 2020-06-11
Attending: OBSTETRICS & GYNECOLOGY
Payer: MEDICARE

## 2020-06-11 PROCEDURE — 97530 THERAPEUTIC ACTIVITIES: CPT

## 2020-06-11 PROCEDURE — 97110 THERAPEUTIC EXERCISES: CPT

## 2020-06-11 NOTE — PROGRESS NOTES
Reece Metzger  : 1945  [place]  Secondary: David Aguilar at [facility], [address], [place] [address]  [phone]   [phone]      OUTPATIENT PHYSICAL THERAPY: Daily Treatment Note 2020   Visit Count:  3  ICD-10: Treatment Diagnosis: N39.3 Stress incontinence (female) (male), R27.8 Lack of coordination (muscle incoordination), N39.41 Urge incontinence, R35.0 Frequency of micturition, M62.81 Generalized weakness  Precautions/Allergies:   Cortisone   TREATMENT PLAN:  Effective Dates: 2020 TO 2020 (90 days). Frequency/Duration: 1 time a week for 90 Day(s) MEDICAL/REFERRING DIAGNOSIS:  HALEY (stress urinary incontinence, female) [N39.3]   DATE OF ONSET: chronic  REFERRING PHYSICIAN: Steve Arenas DO  MD Orders: Evaluate and treat  Return MD Appointment: 8/3/2020     Pre-treatment Symptoms/Complaints: Pt states that UI was \"really bad\" yesterday. Reports difficulty going from sit to stand. She has not tried urge suppression this week. Pain: Initial: Pain Intensity 1: 0  Post Session:  0/10   Medications Last Reviewed:  2020  Updated Objective Findings:  None Today   TREATMENT:   THERAPEUTIC ACTIVITY: ( 10 minutes): Therapeutic activities per grid below to improve strength, coordination and dynamic movement of core to improve functional transitional movements. Required moderate visual and verbal cues to promote coordination of breathing with kegel and movement. THERAPEUTIC EXERCISE: (35 minutes):  Exercises per grid below to improve mobility, strength and coordination. Required moderate visual and verbal cues to promote proper body breathing techniques and PFM activation. Progressed resistance, range, repetitions and complexity of movement as indicated. All exercises performed with emphasis on kegel and breathing in order improve coordination, awareness and to minimize symptoms.   TE= therapeutic exercise, TA= therapeutic activity, MT= manual therapy, NE= neuro re-education   Date:  5/25/20 Date:  6/4/20 Date:  6/11/20   Activity/Exercise Parameters Parameters Parameters   PFM coordination/strength 10 minutes Holds and QF w/ sEMG; 4H/4R- 20 minutes    Urge suppression  15 minutes    Bladder health  10 minutes    Stepper   8 minutes   Hip adduction   3x10 w/ 5s hold, ball squeeze   Hip abduction   3x10 w/ RTB   Bridge   3x10   Sit to stand   2x10   Log rolll   x10                 Pt gives verbal consent to internal vaginal assessment/treatment without chaperon present. (not today)    Treatment/Session Summary:    · Response to Treatment:  Pt has not tried urge suppression techniques as instructed this week. She was educated today in stress urinary incontinence and management of changes to intra-abdominal pressure in order to improve bladder control with transitional movements. She requires moderate verbal and visual cuing in order to improve PFM activation, reduction of accessory muscle use and breathing with the addition of movement. She did well with this. We spent time discussing and working on transitional movements, including sit <> stand and supine <> sit, as these are difficult and often when she leaks urine. She was encouraged to implement this week in order to improve bladder control. · Communication/Consultation:  None today  · Equipment provided today:  None today  · Recommendations/Intent for next treatment session: Next visit will focus on coordination of kegel + movement.   Total Treatment Billable Duration: 35 minutes TE, 10 minutes TA  PT Patient Time In/Time Out  Time In: 1400  Time Out: 1500  Mary Beth Mauro, PT, DPT    Future Appointments   Date Time Provider Karthikeyan Oreilly   6/18/2020  2:00 PM Blaine Mehta, PT, DPT [facility]   6/25/2020  2:00 PM Dean Gray, PT, DPT [facility]   8/3/2020  2:45 PM Margret Harden, [address]

## 2020-06-25 ENCOUNTER — HOSPITAL ENCOUNTER (OUTPATIENT)
Dept: PHYSICAL THERAPY | Age: 75
Discharge: HOME | End: 2020-06-25
Attending: OBSTETRICS & GYNECOLOGY
Payer: MEDICARE

## 2020-06-25 PROCEDURE — 97110 THERAPEUTIC EXERCISES: CPT

## 2020-06-25 NOTE — PROGRESS NOTES
Cris Ramsey  : 1945  [place]  Secondary: [facility], [address], [place] [address]  [phone]   [phone]      OUTPATIENT PHYSICAL THERAPY: Daily Treatment Note 2020   Visit Count:  4  ICD-10: Treatment Diagnosis: N39.3 Stress incontinence (female) (male), R27.8 Lack of coordination (muscle incoordination), N39.41 Urge incontinence, R35.0 Frequency of micturition, M62.81 Generalized weakness  Precautions/Allergies:   Cortisone   TREATMENT PLAN:  Effective Dates: 2020 TO 2020 (90 days). Frequency/Duration: 1 time a week for 90 Day(s) MEDICAL/REFERRING DIAGNOSIS:  HALEY (stress urinary incontinence, female) [N39.3]   DATE OF ONSET: chronic  REFERRING PHYSICIAN: Yoana Wilson DO  MD Orders: Evaluate and treat  Return MD Appointment: 8/3/2020     Pre-treatment Symptoms/Complaints: Pt arrive to PT 15 minutes late today. Requests for today to be her last visit due to traveling out of state for an unknown period of time. Pain: Initial: Pain Intensity 1: 0  Post Session:  0/10   Medications Last Reviewed:  2020  Updated Objective Findings:  See discharge assessement   TREATMENT:   THERAPEUTIC EXERCISE: (40 minutes):  Exercises per grid below to improve mobility, strength and coordination. Required moderate visual and verbal cues to promote proper body breathing techniques and PFM activation. Progressed resistance, range, repetitions and complexity of movement as indicated. All exercises performed with emphasis on kegel and breathing in order improve coordination, awareness and to minimize symptoms.   TE= therapeutic exercise, TA= therapeutic activity, MT= manual therapy, NE= neuro re-education   Date:  20 Date:  20 Date:  20 Date:  20   Activity/Exercise Parameters Parameters Parameters    PFM coordination/strength 10 minutes Holds and QF w/ sEMG; 4H/4R- 20 minutes  Holds and QF w/ manual palpation-- 10 minutes   Urge suppression  15 minutes     Bladder health  10 minutes     Stepper   8 minutes    Hip adduction   3x10 w/ 5s hold, ball squeeze 3x10 w/ 5s hold, ball squeeze   Hip abduction   3x10 w/ RTB 3x10 w/ RTB   Bridge   3x10 3x10   Sit to stand   2x10 3x10   Log rolll   x10 reviewed   HEP    Updated and reviewed for compliance            Pt gives verbal consent to internal vaginal assessment/treatment without chaperon present. (not today)    Treatment/Session Summary:    · Response to Treatment:   Ms. Rios Rawls has been seen in skilled PT from 5/21/20 to 6/25/20. Patient has attended 4 out of 5 scheduled visits, with 1 cancellation(s) and 0 no shows. Treatment has emphasized PFM coordination, strengthening, integration of kegel with movement, urge suppression and bladder health education. Patient has had poor compliance with HEP and incorporation of techniques into daily life. She has had some improvements with controlling urgency, however continues to have issues with urinary incontinence and states \"there's no stopping anything when she stands up. \" She will be traveling out of state for an unknown amount of time. Pt was provided an HEP and encouraged to continue daily. Pt has achieved goals as indicated below and all questions have been answered to their satisfaction. Pt was invited to call with any further questions or concerns as needed.   · Communication/Consultation:  None today  · Equipment provided today:  None today  Total Treatment Billable Duration: 40 minutes TE  PT Patient Time In/Time Out  Time In: 1420  Time Out: 200 Oliver Gonzalez, PT, DPT    Future Appointments   Date Time Provider Karthikeyan Oreilly   8/3/2020  2:45 PM Todd Calderon, [address]

## 2020-06-29 NOTE — THERAPY DISCHARGE
Shaka [facility] : 1945 [place] Secondary: [facility], [address], [place] [address] [phone]   [phone] OUTPATIENT PHYSICAL THERAPY:Discharge Summary 2020 ICD-10: Treatment Diagnosis: N39.3 Stress incontinence (female) (male), R27.8 Lack of coordination (muscle incoordination), N39.41 Urge incontinence, R35.0 Frequency of micturition, M62.81 Generalized weakness Precautions/Allergies:  
Cortisone TREATMENT PLAN: 
Effective Dates: 2020 TO 2020 (90 days). Frequency/Duration: 1 time a week for 90 Day(s) MEDICAL/REFERRING DIAGNOSIS: 
Stress incontinence (female) (male) [N39.3] DATE OF ONSET: chronic REFERRING PHYSICIAN: Mary Hong DO MD Orders: Evaluate and treat Return MD Appointment: 8/3/2020 DISCHARGE ASSESSMENT:  Ms. Kasie Almaraz has been seen in skilled PT from 20 to 20. Patient has attended 4 out of 5 scheduled visits, with 1 cancellation(s) and 0 no shows. Treatment has emphasized PFM coordination, strengthening, integration of kegel with movement, urge suppression and bladder health education. Patient has had poor compliance with HEP and incorporation of techniques into daily life. She has had some improvements with controlling urgency, however continues to have issues with urinary incontinence and states \"there's no stopping anything when she stands up. \" She will be traveling out of state for an unknown amount of time. Pt was provided an HEP and encouraged to continue daily. Pt has achieved goals as indicated below and all questions have been answered to their satisfaction. Pt was invited to call with any further questions or concerns as needed. GOALS: (Goals have been discussed and agreed upon with patient.) Short-Term Functional Goals: Time Frame: 6 weeks 1.  Pt will demonstrate I with basic PFM HEP to improve awareness, coordination, and timing of PFM. (MET 6/25/20) 2. Pt will be able to isolated PFM contraction without compensation. (MET 6/25/20) 3. Pt will increase water intake by 16 oz and decrease irritant consumption by 8 oz to improve bladder health. (NOT MET 6/25/20) Discharge Goals: Time Frame: 12 weeks 1. Pt will demonstrate appropriate use of the pelvic floor muscle group (quick flicks and/or drops), without compensation, to implement urge suppression appropriately with urgency of urination and decrease the number of pads used per day. (NOT MET 6/25/20) 2. Pt will be able to demonstrate appropriate use of 'the knack' in order to improve PFM coordination in order to reduce/eliminate stress urinary incontinence during coughing, laughing and sneezing. (NOT MET 6/25/20) 3. Pt will demonstrate appropriate co-contraction of the transversus abdominus and pelvic floor muscle group, and maintain 8 seconds to improve core stability and improve management of UI during daily tasks. (NOT MET 6/25/20) OUTCOME MEASURE:  
Tool Used: Pelvic Floor Impact Questionnaireshort form 7 (PFIQ-7) Score:  Initial: · Bladder or Urine: 24 · Bowel or Rectum: 19 · Vagina or Pelvis: 14 Most Recent: X (Date: -- ) · Bladder or Urine: X 
· Bowel or Rectum: X · Vagina or Pelvis: X Interpretation of Score: Each of the 7 sections is scored on a scale from 0-3; 0 representing \"Not at all\", 3 representing \"Quite a bit\". The mean value is taken from all the answered items, then multiplied by 100 to obtain the scale score, ranging from 0-100. This process is repeated for each column representing bowel, bladder, and pelvic pain. Total Duration: PT Patient Time In/Time Out Time In: 5585 Time Out: 1500 Rehabilitation Potential For Stated Goals: Good Regarding Uma Martinez therapy, I certify that the treatment plan above will be carried out by a therapist or under their direction.  
Thank you for this referral, 
 Swapnil Aas, PT, DPT

## 2020-07-02 ENCOUNTER — APPOINTMENT (OUTPATIENT)
Dept: PHYSICAL THERAPY | Age: 75
End: 2020-07-02
Attending: OBSTETRICS & GYNECOLOGY

## 2020-07-09 ENCOUNTER — APPOINTMENT (OUTPATIENT)
Dept: PHYSICAL THERAPY | Age: 75
End: 2020-07-09
Attending: OBSTETRICS & GYNECOLOGY

## 2020-08-17 PROBLEM — N32.81 OAB (OVERACTIVE BLADDER): Status: ACTIVE | Noted: 2020-08-17

## 2020-08-17 PROBLEM — N36.42 INTRINSIC SPHINCTER DEFICIENCY (ISD): Status: RESOLVED | Noted: 2017-03-09 | Resolved: 2020-08-17

## 2022-03-18 PROBLEM — N39.3 SUI (STRESS URINARY INCONTINENCE, FEMALE): Status: ACTIVE | Noted: 2017-03-09

## 2022-03-19 PROBLEM — N32.81 OAB (OVERACTIVE BLADDER): Status: ACTIVE | Noted: 2020-08-17

## 2022-03-19 PROBLEM — R73.01 IFG (IMPAIRED FASTING GLUCOSE): Status: ACTIVE | Noted: 2018-02-21

## 2022-03-19 PROBLEM — M85.80 OSTEOPENIA DETERMINED BY X-RAY: Status: ACTIVE | Noted: 2018-03-12

## 2022-03-20 PROBLEM — E78.5 HYPERLIPIDEMIA LDL GOAL <100: Status: ACTIVE | Noted: 2018-03-06

## 2022-03-20 PROBLEM — Z00.00 ANNUAL PHYSICAL EXAM: Status: ACTIVE | Noted: 2019-05-20

## 2023-05-30 ENCOUNTER — OFFICE VISIT (OUTPATIENT)
Dept: ORTHOPEDIC SURGERY | Age: 78
End: 2023-05-30
Payer: MEDICARE

## 2023-05-30 DIAGNOSIS — M54.16 LUMBAR RADICULOPATHY: ICD-10-CM

## 2023-05-30 DIAGNOSIS — M54.50 ACUTE LOW BACK PAIN, UNSPECIFIED BACK PAIN LATERALITY, UNSPECIFIED WHETHER SCIATICA PRESENT: Primary | ICD-10-CM

## 2023-05-30 PROCEDURE — 99203 OFFICE O/P NEW LOW 30 MIN: CPT | Performed by: PHYSICAL MEDICINE & REHABILITATION

## 2023-05-30 PROCEDURE — 1123F ACP DISCUSS/DSCN MKR DOCD: CPT | Performed by: PHYSICAL MEDICINE & REHABILITATION

## 2023-05-30 NOTE — PROGRESS NOTES
Date:  05/30/23     HPI:  I am seeing Earle Valles in evalution/folowup. She has had problems in the past for which it sounds like she had fluoroscopically guided spine injections offered benefit. 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 For the last 4-6 months, for unclear reasons, has had more pain in buttocks bilaterally. She is having pain more so left than right. It is a dull pain is worse with standing or walking. Better with lying down flat with her legs flexed. The pain gravitates down the lateral aspect of the left leg to the lateral aspect of the foot for which she has paresthetic symptoms or feeling of Warder running. She has no similar symptoms on the right over the buttock and maybe little bit toward the lateral hip area. No weakness with this. She has tried ibuprofen. No muscle relaxers or oral steroids. No history of spine surgical intervention. She did have fluoroscopically guided spine injections in the past.  Her primary provider ordered some lumbar spine films that reveal degenerative changes. I do not have those records. Current pain scale is 7/10. She has trouble walking, standing, sleeping, doing things around the house. She has not started any physical therapy or lumbar spine exercises.     Past Medical History:   Diagnosis Date    Anxiety     well controlled with med    Arthritis     Cancer ([address]     skin cancer    Depression with anxiety     Diverticulosis 2017    Former smoker     GERD (gastroesophageal reflux disease)     controlled with med    H/O bone density study 2015    normal    H/O colonoscopy 3/15/11    Due 2016    History of breast cancer 1995    right--- surg only    Hx of colonic polyps     Hyperlipidemia LDL goal <100 3/6/2018    Hypertension     pt denies this dx    IFG (impaired fasting glucose) 2/21/2018    Nausea & vomiting     at times-- but none lately    Osteopenia determined by x-ray 3/12/2018    SVT (supraventricular

## 2023-06-20 ENCOUNTER — HOSPITAL ENCOUNTER (OUTPATIENT)
Dept: PHYSICAL THERAPY | Age: 78
Setting detail: RECURRING SERIES
Discharge: HOME | End: 2023-06-23
Attending: PHYSICAL MEDICINE & REHABILITATION
Payer: MEDICARE

## 2023-06-20 PROCEDURE — 97140 MANUAL THERAPY 1/> REGIONS: CPT

## 2023-06-20 PROCEDURE — 97110 THERAPEUTIC EXERCISES: CPT

## 2023-06-20 ASSESSMENT — PAIN SCALES - GENERAL: PAINLEVEL_OUTOF10: 3

## 2023-06-20 NOTE — PROGRESS NOTES
Mihaela Nicholson  : 1945  Primary: Medicare Part A And B (Commercial)  Secondary: [address] @ [address] [address]  Phone: [phone]  Fax: [phone] Plan Frequency: 2x/wk for 90 days    Plan of Care/Certification Expiration Date: 23      >PT Visit Info:  Plan Frequency: 2x/wk for 90 days  Plan of Care/Certification Expiration Date: 23  Total # of Visits to Date: 3  Progress Note Counter: 3      Visit Count:  3    OUTPATIENT PHYSICAL THERAPY:OP NOTE TYPE: Treatment Note 2023       Episode  }Appt Desk             Treatment Diagnosis:  Other Low Back Pain (M54.59)  Abnormal posture (R29.3)  Medical/Referring Diagnosis:  Acute low back pain, unspecified back pain laterality, unspecified whether sciatica present [M54.50]  Referring Physician:  Artem Stovall MD MD Orders:  PT Eval and Treat   Date of Onset:  Approximately 2023  Allergies:   Cortisone  Restrictions/Precautions:  No data recordedNo data recorded     Interventions Planned (Treatment may consist of any combination of the following):    Current Treatment Recommendations: Strengthening; ROM; Gait training; Manual; Pain management; Home exercise program; Patient/Caregiver education & training; Modalities; Aquatics       >Subjective Comments:  Pt had lumbar MRI which revealed:  1. Multilevel lumbar spondylosis. 2.  At L3-L4, there is moderate spinal canal stenosis with moderate to severe  left and mild-to-moderate right neural foraminal narrowing. 3.  At L4-L5, there is severe spinal canal stenosis with moderate right neural  foraminal narrowing. Pt states her back is feeling pretty good this morning, pain 3/10. She states MD called her with MRI results and told her that surgery was not needed and to just continue PT.   She states the MRI did reveal cysts on her kidney and she is having a kidney ultrasound next

## 2023-06-23 ENCOUNTER — HOSPITAL ENCOUNTER (OUTPATIENT)
Dept: PHYSICAL THERAPY | Age: 78
Setting detail: RECURRING SERIES
Discharge: HOME | End: 2023-06-26
Attending: PHYSICAL MEDICINE & REHABILITATION
Payer: MEDICARE

## 2023-06-23 PROCEDURE — 97110 THERAPEUTIC EXERCISES: CPT

## 2023-06-23 PROCEDURE — 97140 MANUAL THERAPY 1/> REGIONS: CPT

## 2023-06-23 ASSESSMENT — PAIN SCALES - GENERAL: PAINLEVEL_OUTOF10: 1

## 2023-06-27 ENCOUNTER — HOSPITAL ENCOUNTER (OUTPATIENT)
Dept: ULTRASOUND IMAGING | Age: 78
Discharge: HOME | End: 2023-06-30
Attending: PHYSICAL MEDICINE & REHABILITATION
Payer: MEDICARE

## 2023-06-27 DIAGNOSIS — N28.1 KIDNEY CYSTS: ICD-10-CM

## 2023-06-27 DIAGNOSIS — R93.7 ABNORMAL FINDINGS ON DIAGNOSTIC IMAGING OF SPINE: ICD-10-CM

## 2023-06-27 PROCEDURE — 76770 US EXAM ABDO BACK WALL COMP: CPT

## 2023-06-28 ENCOUNTER — TELEPHONE (OUTPATIENT)
Dept: ORTHOPEDIC SURGERY | Age: 78
End: 2023-06-28

## 2023-08-17 ENCOUNTER — TELEPHONE (OUTPATIENT)
Dept: ORTHOPEDIC SURGERY | Age: 78
End: 2023-08-17

## 2023-08-17 DIAGNOSIS — M54.50 ACUTE LOW BACK PAIN, UNSPECIFIED BACK PAIN LATERALITY, UNSPECIFIED WHETHER SCIATICA PRESENT: Primary | ICD-10-CM

## 2023-08-17 NOTE — TELEPHONE ENCOUNTER
Called spoke to patient states she needs a new order for PT it . New PT order has been placed. Patient will call PT to schedule appointment. Patient verbalized understanding.

## 2023-08-17 NOTE — TELEPHONE ENCOUNTER
Dr. Verito Cha prescribed PT and she went on vacation and now needs to get a new order. This was a voicemail so I don't know where she wants to go for therapy.

## 2023-08-22 ENCOUNTER — HOSPITAL ENCOUNTER (OUTPATIENT)
Dept: PHYSICAL THERAPY | Age: 78
Setting detail: RECURRING SERIES
Discharge: HOME | End: 2023-08-25
Attending: PHYSICAL MEDICINE & REHABILITATION
Payer: MEDICARE

## 2023-08-22 PROCEDURE — 97110 THERAPEUTIC EXERCISES: CPT

## 2023-08-22 PROCEDURE — 97162 PT EVAL MOD COMPLEX 30 MIN: CPT

## 2023-08-22 NOTE — PROGRESS NOTES
Leila Gipson  : 1945  Primary: Medicare Part A And B (Medicare)  Secondary: [address] @ [place] [address]  [address] [facility] [address]  Phone: [phone]  Fax: [phone] Plan Frequency: 2x/wk ofr 90 days    Plan of Care/Certification Expiration Date: 23      >PT Visit Info:  Plan Frequency: 2x/wk ofr 90 days  Plan of Care/Certification Expiration Date: 23  Total # of Visits to Date: 1  Progress Note Counter: 1      Visit Count:  1    OUTPATIENT PHYSICAL THERAPY:OP NOTE TYPE: OP Note Type: Treatment Note 2023       Episode  }Appt Desk             Treatment Diagnosis:  Other Low Back Pain (M54.59)  Abnormal posture (R29.3)  Medical/Referring Diagnosis:  Acute low back pain, unspecified back pain laterality, unspecified whether sciatica present [M54.50]  Referring Physician:  Yg Haro MD MD Orders:  PT Eval and Treat   Date of Onset:  No data recorded   Allergies:   Cortisone  Restrictions/Precautions:  No data recordedNo data recorded     Interventions Planned (Treatment may consist of any combination of the following):    Current Treatment Recommendations: Strengthening; ROM; Manual; Pain management; Home exercise program; Patient/Caregiver education & training; Modalities; Aquatics     >Subjective Comments:  Pt reports low back pain and L LE pain. >Initial:      1/10>Post Session:        1/10  Medications Last Reviewed:  2023  Updated Objective Findings:  See evaluation note from today  Treatment   THERAPEUTIC EXERCISE: (10 minutes):    Exercises per grid below to improve mobility and strength. Required minimal verbal cues to promote proper body alignment and promote proper body posture. Progressed resistance and repetitions as indicated.    Date:  23 Date:   Date:     Activity/Exercise Parameters Parameters Parameters   SKTC 3 reps  20 sec holds  B LE's  (HEP)     Active Hamstring Stretch 3

## 2023-08-22 NOTE — THERAPY EVALUATION
Franc Baumann  : 1945  Primary: Medicare Part A And B (Medicare)  Secondary: Kamala Ford @ [address] [address]  Phone: [phone]  Fax: [phone] Plan Frequency: 2x/wk ofr 90 days    Plan of Care/Certification Expiration Date: 23      PT Visit Info:  Plan Frequency: 2x/wk ofr 90 days  Plan of Care/Certification Expiration Date: 23  Total # of Visits to Date: 1  Progress Note Counter: 1      Visit Count:  1                OUTPATIENT PHYSICAL THERAPY:             OP NOTE TYPE: Initial Assessment 2023               Episode (PT: Low Back Pain) Appt Desk         Treatment Diagnosis:  Other Low Back Pain (M54.59)  Abnormal posture (R29.3)  Medical/Referring Diagnosis:  Acute low back pain, unspecified back pain laterality, unspecified whether sciatica present [M54.50]  Referring Physician:  Francisco Miles MD MD Orders:  PT Eval and Treat   Return MD Appt:  Pt does not have a follow up appt at this time  Date of Onset:    Chronic  Allergies:  Cortisone  Restrictions/Precautions:      N/A     Medications Last Reviewed:  2023     SUBJECTIVE   History of Injury/Illness (Reason for Referral):  Pt reports chronic low back pain. She states she had a L TKA approximately 10 years ago and had worsening low back and L LE pain after her knee replacement. Pt had a lumbar MRI in 2023 which revealed:  1. Multilevel lumbar spondylosis  2. At L3-L4, there is moderate spinal canal stenosis with moderate to severe  left and mild-to-moderate right neural foraminal narrowing. 3.  At L4-L5, there is severe spinal canal stenosis with moderate right neural  foraminal narrowing. Pt attended physical therapy this year and went on vacation and her PT order  and she is now referred back for further PT. Pt states MD has discussed trying some lumbar injections if physical therapy does not help.

## 2023-08-29 ENCOUNTER — HOSPITAL ENCOUNTER (OUTPATIENT)
Dept: PHYSICAL THERAPY | Age: 78
Setting detail: RECURRING SERIES
Discharge: HOME | End: 2023-09-01
Attending: PHYSICAL MEDICINE & REHABILITATION
Payer: MEDICARE

## 2023-08-29 PROCEDURE — 97140 MANUAL THERAPY 1/> REGIONS: CPT

## 2023-08-29 PROCEDURE — 97110 THERAPEUTIC EXERCISES: CPT

## 2023-08-29 NOTE — PROGRESS NOTES
Marie Myrick  : 1945  Primary: Medicare Part A And B (Medicare)  Secondary: Kamala Ford @ [place] [address]  [address] [facility] [address]  Phone: [phone]  Fax: [phone] Plan Frequency: 2x/wk ofr 90 days    Plan of Care/Certification Expiration Date: 23      >PT Visit Info:  Plan Frequency: 2x/wk ofr 90 days  Plan of Care/Certification Expiration Date: 23  Total # of Visits to Date: 2  Progress Note Counter: 2      Visit Count:  2    OUTPATIENT PHYSICAL THERAPY:OP NOTE TYPE: OP Note Type: Treatment Note 2023       Episode  }Appt Desk             Treatment Diagnosis:  Other Low Back Pain (M54.59)  Abnormal posture (R29.3)  Medical/Referring Diagnosis:  Acute low back pain, unspecified back pain laterality, unspecified whether sciatica present [M54.50]  Referring Physician:  Elliot Nash MD MD Orders:  PT Eval and Treat   Date of Onset:  No data recorded   Allergies:   Cortisone  Restrictions/Precautions:  No data recordedNo data recorded     Interventions Planned (Treatment may consist of any combination of the following):    Current Treatment Recommendations: Strengthening; ROM; Manual; Pain management; Home exercise program; Patient/Caregiver education & training; Modalities; Aquatics     >Subjective Comments:  Pt states her pain isn't too bad today. She rates pain 1/10. \"I mainly get pain after sitting for a long time. \"  >Initial:      1/10>Post Session:        1/10  Medications Last Reviewed:  2023  Updated Objective Findings:  None Today  Treatment   THERAPEUTIC EXERCISE: (25 minutes):    Exercises per grid below to improve mobility and strength. Required minimal verbal cues to promote proper body alignment and promote proper body posture. Progressed resistance and repetitions as indicated. MANUAL THERAPY: (15 minutes):    Soft tissue mobilization was utilized and necessary because of the patient's [Time Spent: ___ minutes] : I have spent [unfilled] minutes of time on the encounter.

## 2023-09-05 ENCOUNTER — HOSPITAL ENCOUNTER (OUTPATIENT)
Dept: PHYSICAL THERAPY | Age: 78
Setting detail: RECURRING SERIES
Discharge: HOME | End: 2023-09-08
Attending: PHYSICAL MEDICINE & REHABILITATION
Payer: MEDICARE

## 2023-09-05 PROCEDURE — 97110 THERAPEUTIC EXERCISES: CPT

## 2023-09-05 PROCEDURE — 97140 MANUAL THERAPY 1/> REGIONS: CPT

## 2023-09-05 NOTE — PROGRESS NOTES
Merlyn Eid  : 1945  Primary: Medicare Part A And B (Medicare)  Secondary: [address] @ [place] [address]  [address] [facility] [address]  Phone: [phone]  Fax: [phone] Plan Frequency: 2x/wk ofr 90 days    Plan of Care/Certification Expiration Date: 23      >PT Visit Info:  Plan Frequency: 2x/wk ofr 90 days  Plan of Care/Certification Expiration Date: 23  Total # of Visits to Date: 3  Progress Note Counter: 3      Visit Count:  3    OUTPATIENT PHYSICAL THERAPY:OP NOTE TYPE: OP Note Type: Treatment Note 2023       Episode  }Appt Desk             Treatment Diagnosis:  Other Low Back Pain (M54.59)  Abnormal posture (R29.3)  Medical/Referring Diagnosis:  Acute low back pain, unspecified back pain laterality, unspecified whether sciatica present [M54.50]  Referring Physician:  Maged Schafer MD MD Orders:  PT Eval and Treat   Date of Onset:  No data recorded   Allergies:   Cortisone  Restrictions/Precautions:  No data recordedNo data recorded     Interventions Planned (Treatment may consist of any combination of the following):    Current Treatment Recommendations: Strengthening; ROM; Manual; Pain management; Home exercise program; Patient/Caregiver education & training; Modalities; Aquatics     >Subjective Comments:  Pt states she is feeling pretty good today, no pain this afternoon. >Initial:      0/10>Post Session:        0/10  Medications Last Reviewed:  2023  Updated Objective Findings:  None Today  Treatment   THERAPEUTIC EXERCISE: (25 minutes):    Exercises per grid below to improve mobility and strength. Required minimal verbal cues to promote proper body alignment and promote proper body posture. Progressed resistance and repetitions as indicated. MANUAL THERAPY: (15 minutes): Soft tissue mobilization was utilized and necessary because of the patient's restricted motion of soft tissue.   To L lumbar

## 2023-09-07 ENCOUNTER — HOSPITAL ENCOUNTER (OUTPATIENT)
Dept: PHYSICAL THERAPY | Age: 78
Setting detail: RECURRING SERIES
End: 2023-09-07
Attending: PHYSICAL MEDICINE & REHABILITATION
Payer: MEDICARE

## 2023-09-07 NOTE — PROGRESS NOTES
Josef Trotter  : 1945  Primary: Medicare Part A And B  Secondary: [address] @ [place] [address]  Phone: [phone]  Fax: [phone]    PT Visit Info: Total # of Visits to Date: 3  Progress Note Counter: 3     OT Visit Info:  No data recorded    OUTPATIENT THERAPY: 2023  Episode  Appt Desk        Josef Trotter cancelled her appointment for today due to illness. Will plan to follow up next during next appointment.   Thank you,  Usman Paredes, PT    Future Appointments   Date Time Provider [address]   2023  3:15 PM Margy Gtz PTA [facility]   9/15/2023  3:15 PM Keya Ramírez PT [facility]   2023  3:15 PM Usman Paredes PT [facility]   2023  1:45 PM Usman Paredes PT [facility]   2023  1:00 PM Usman Paredes PT [facility]   2023  1:45 PM Sonia Islas PTA [facility]

## 2023-09-12 ENCOUNTER — HOSPITAL ENCOUNTER (OUTPATIENT)
Dept: PHYSICAL THERAPY | Age: 78
Setting detail: RECURRING SERIES
Discharge: HOME | End: 2023-09-15
Attending: PHYSICAL MEDICINE & REHABILITATION
Payer: MEDICARE

## 2023-09-12 PROCEDURE — 97140 MANUAL THERAPY 1/> REGIONS: CPT

## 2023-09-12 PROCEDURE — 97110 THERAPEUTIC EXERCISES: CPT

## 2023-09-12 NOTE — PROGRESS NOTES
Evelia Butts  : 1945  Primary: Medicare Part A And B (Medicare)  Secondary: Kamala Ford @ [address] [address]  Phone: [phone]  Fax: [phone] Plan Frequency: 2x/wk ofr 90 days    Plan of Care/Certification Expiration Date: 23      >PT Visit Info:  Plan Frequency: 2x/wk ofr 90 days  Plan of Care/Certification Expiration Date: 23  Total # of Visits to Date: 3  Progress Note Counter: 3      Visit Count:  4    OUTPATIENT PHYSICAL THERAPY:OP NOTE TYPE: OP Note Type: Treatment Note 2023       Episode  }Appt Desk             Treatment Diagnosis:  Other Low Back Pain (M54.59)  Abnormal posture (R29.3)  Medical/Referring Diagnosis:  Acute low back pain, unspecified back pain laterality, unspecified whether sciatica present [M54.50]  Referring Physician:  Velia Pascual MD MD Orders:  PT Eval and Treat   Date of Onset:  No data recorded   Allergies:   Cortisone  Restrictions/Precautions:  No data recordedNo data recorded     Interventions Planned (Treatment may consist of any combination of the following):    Current Treatment Recommendations: Strengthening; ROM; Manual; Pain management; Home exercise program; Patient/Caregiver education & training; Modalities; Aquatics     >Subjective Comments:  Sitting too long and walking too long bother me most     >Initial:      210>Post Session:        2/10  Medications Last Reviewed:  2023  Updated Objective Findings:  None Today  Treatment   THERAPEUTIC EXERCISE: (25 minutes):    Exercises per grid below to improve mobility and strength. Required minimal verbal cues to promote proper body alignment and promote proper body posture. Progressed resistance and repetitions as indicated. MANUAL THERAPY: (15 minutes): Soft tissue mobilization was utilized and necessary because of the patient's restricted motion of soft tissue.   To L lumbar

## 2023-09-15 ENCOUNTER — HOSPITAL ENCOUNTER (OUTPATIENT)
Dept: PHYSICAL THERAPY | Age: 78
Setting detail: RECURRING SERIES
Discharge: HOME | End: 2023-09-18
Attending: PHYSICAL MEDICINE & REHABILITATION
Payer: MEDICARE

## 2023-09-15 PROCEDURE — 97140 MANUAL THERAPY 1/> REGIONS: CPT

## 2023-09-15 PROCEDURE — 97110 THERAPEUTIC EXERCISES: CPT

## 2023-09-19 ENCOUNTER — HOSPITAL ENCOUNTER (OUTPATIENT)
Dept: PHYSICAL THERAPY | Age: 78
Setting detail: RECURRING SERIES
Discharge: HOME | End: 2023-09-22
Attending: PHYSICAL MEDICINE & REHABILITATION
Payer: MEDICARE

## 2023-09-19 PROCEDURE — 97110 THERAPEUTIC EXERCISES: CPT

## 2023-09-19 PROCEDURE — 97140 MANUAL THERAPY 1/> REGIONS: CPT

## 2023-09-19 NOTE — PROGRESS NOTES
Denia Lopez  : 1945  Primary: Medicare Part A And B (Medicare)  Secondary: [address] @ [place] [address]  [place] [address]  Phone: [phone]  Fax: [phone] Plan Frequency: 2x/wk ofr 90 days    Plan of Care/Certification Expiration Date: 23      >PT Visit Info:  Plan Frequency: 2x/wk ofr 90 days  Plan of Care/Certification Expiration Date: 23  Total # of Visits to Date: 6  Progress Note Counter: 6      Visit Count:  6    OUTPATIENT PHYSICAL THERAPY:OP NOTE TYPE: OP Note Type: Treatment Note 2023       Episode  }Appt Desk             Treatment Diagnosis:  Other Low Back Pain (M54.59)  Abnormal posture (R29.3)  Medical/Referring Diagnosis:  Acute low back pain, unspecified back pain laterality, unspecified whether sciatica present [M54.50]  Referring Physician:  Aileen Hawkins MD MD Orders:  PT Eval and Treat   Date of Onset:  No data recorded   Allergies:   Cortisone  Restrictions/Precautions:  No data recordedNo data recorded     Interventions Planned (Treatment may consist of any combination of the following):    Current Treatment Recommendations: Strengthening; ROM; Manual; Pain management; Home exercise program; Patient/Caregiver education & training; Modalities; Aquatics     >Subjective Comments:    Pt states her back is hurting today. She states her pain increases with prolonged walking. She also reports L shoulder pain today from lifting groceries. >Initial:      6/10>Post Session:        5/10  Medications Last Reviewed:  2023  Updated Objective Findings:  None Today  Treatment   THERAPEUTIC EXERCISE: (25 minutes):    Exercises per grid below to improve mobility and strength. Required minimal verbal cues to promote proper body alignment and promote proper body posture. Progressed resistance and repetitions as indicated. MANUAL THERAPY: (15 minutes):    Soft tissue mobilization was

## 2023-09-21 ENCOUNTER — HOSPITAL ENCOUNTER (OUTPATIENT)
Dept: PHYSICAL THERAPY | Age: 78
Setting detail: RECURRING SERIES
End: 2023-09-21
Attending: PHYSICAL MEDICINE & REHABILITATION
Payer: MEDICARE

## 2023-09-21 NOTE — PROGRESS NOTES
Krystin Jefferson  : 1945  Primary: Medicare Part A And B  Secondary: Kamala Ford @ [place] [id number]  Phone: [phone]  Fax: [phone]    PT Visit Info: Total # of Visits to Date: 6  Progress Note Counter: 6     OT Visit Info:  No data recorded    OUTPATIENT THERAPY: 2023  Episode  Appt Desk        Krystin Jefferson cancelled her appointment for today due to unknown reasons. Will plan to follow up next during next appointment.   Thank you,  Ayad Bradley, PT    Future Appointments   Date Time Provider [address]   2023  7:00 PM Ayad Bradley, PT [facility]   2023  1:00 PM Ayad Bradley, PT [facility]   2023  7:00 PM Claudeen [employer], PTA [facility]

## 2023-09-25 ENCOUNTER — TELEPHONE (OUTPATIENT)
Dept: ORTHOPEDIC SURGERY | Age: 78
End: 2023-09-25

## 2023-09-25 DIAGNOSIS — M54.16 LUMBAR RADICULOPATHY: Primary | ICD-10-CM

## 2023-09-26 ENCOUNTER — HOSPITAL ENCOUNTER (OUTPATIENT)
Dept: PHYSICAL THERAPY | Age: 78
Setting detail: RECURRING SERIES
Discharge: HOME | End: 2023-09-29
Attending: PHYSICAL MEDICINE & REHABILITATION
Payer: MEDICARE

## 2023-09-26 ENCOUNTER — TELEPHONE (OUTPATIENT)
Dept: ORTHOPEDIC SURGERY | Age: 78
End: 2023-09-26

## 2023-09-26 PROCEDURE — 97140 MANUAL THERAPY 1/> REGIONS: CPT

## 2023-09-26 PROCEDURE — 97110 THERAPEUTIC EXERCISES: CPT

## 2023-09-26 NOTE — PROGRESS NOTES
Marilyn Guillen  : 1945  Primary: Medicare Part A And B (Medicare)  Secondary: Kamala Ford @ [place] [address]  [address] [facility] [address]  Phone: [phone]  Fax: [phone] Plan Frequency: 2x/wk ofr 90 days    Plan of Care/Certification Expiration Date: 23      >PT Visit Info:  Plan Frequency: 2x/wk ofr 90 days  Plan of Care/Certification Expiration Date: 23  Total # of Visits to Date: 7  Progress Note Counter: 7      Visit Count:  7    OUTPATIENT PHYSICAL THERAPY:OP NOTE TYPE: OP Note Type: Treatment Note 2023       Episode  }Appt Desk             Treatment Diagnosis:  Other Low Back Pain (M54.59)  Abnormal posture (R29.3)  Medical/Referring Diagnosis:  Acute low back pain, unspecified back pain laterality, unspecified whether sciatica present [M54.50]  Referring Physician:  Sergo Vizcarra.MD SERVIN Orders:  PT Eval and Treat   Date of Onset:  No data recorded   Allergies:   Cortisone  Restrictions/Precautions:  No data recordedNo data recorded     Interventions Planned (Treatment may consist of any combination of the following):    Current Treatment Recommendations: Strengthening; ROM; Manual; Pain management; Home exercise program; Patient/Caregiver education & training; Modalities; Aquatics     >Subjective Comments: \"Today is my last day of PT\". She states she is getting an injection in 2 weeks. She also states she is going to start working out at [employer]. >Initial:      6/10>Post Session:        5/10  Medications Last Reviewed:  2023  Updated Objective Findings:  None Today  Treatment   THERAPEUTIC EXERCISE: (25 minutes):    Exercises per grid below to improve mobility and strength. Required minimal verbal cues to promote proper body alignment and promote proper body posture. Progressed resistance and repetitions as indicated. MANUAL THERAPY: (15 minutes):    Soft tissue mobilization was utilized and

## 2023-09-26 NOTE — TELEPHONE ENCOUNTER
Patient has been scheduled for 4-5 MARLENA with  [facility] 10/10 10:30am  Patient informed to please have a .

## 2023-09-27 NOTE — THERAPY DISCHARGE
Joshua Dasilva  : 1945  Primary: Medicare Part A And B (Medicare)  Secondary: Kamala Ford @ [address] [address]  Phone: [phone]  Fax: [phone] Plan Frequency: 2x/wk ofr 90 days    Plan of Care/Certification Expiration Date: 23      PT Visit Info:  Plan Frequency: 2x/wk ofr 90 days  Plan of Care/Certification Expiration Date: 23  Total # of Visits to Date: 7  Progress Note Counter: 7      Visit Count:  7                OUTPATIENT PHYSICAL THERAPY:             OP NOTE TYPE: Discharge Summary 2023               Episode (PT: Low Back Pain) Appt Desk         Treatment Diagnosis:  Other Low Back Pain (M54.59)  Abnormal posture (R29.3)  Medical/Referring Diagnosis:  Acute low back pain, unspecified back pain laterality, unspecified whether sciatica present [M54.50]  Referring Physician:  Ez Holbrook MD MD Orders:  PT Eval and Treat   Return MD Appt:  Pt does not have a follow up appt at this time  Date of Onset:    Chronic  Allergies:  Cortisone  Restrictions/Precautions:      N/A     Medications Last Reviewed:  2023     SUBJECTIVE   History of Injury/Illness (Reason for Referral):  Pt reports chronic low back pain. She states she had a L TKA approximately 10 years ago and had worsening low back and L LE pain after her knee replacement. Pt had a lumbar MRI in 2023 which revealed:  1. Multilevel lumbar spondylosis  2. At L3-L4, there is moderate spinal canal stenosis with moderate to severe  left and mild-to-moderate right neural foraminal narrowing. 3.  At L4-L5, there is severe spinal canal stenosis with moderate right neural  foraminal narrowing. Pt attended physical therapy this year and went on vacation and her PT order  and she is now referred back for further PT. Pt states MD has discussed trying some lumbar injections if physical therapy does not help.

## 2023-09-28 ENCOUNTER — APPOINTMENT (OUTPATIENT)
Dept: PHYSICAL THERAPY | Age: 78
End: 2023-09-28
Attending: PHYSICAL MEDICINE & REHABILITATION
Payer: MEDICARE

## 2023-10-10 ENCOUNTER — OFFICE VISIT (OUTPATIENT)
Dept: ORTHOPEDIC SURGERY | Age: 78
End: 2023-10-10
Payer: MEDICARE

## 2023-10-10 DIAGNOSIS — M54.16 LUMBAR RADICULOPATHY: Primary | ICD-10-CM

## 2023-10-10 PROCEDURE — 62323 NJX INTERLAMINAR LMBR/SAC: CPT | Performed by: PHYSICAL MEDICINE & REHABILITATION

## 2023-10-10 RX ORDER — TRIAMCINOLONE ACETONIDE 40 MG/ML
100 INJECTION, SUSPENSION INTRA-ARTICULAR; INTRAMUSCULAR ONCE
Status: COMPLETED | OUTPATIENT
Start: 2023-10-10 | End: 2023-10-10

## 2023-10-10 RX ADMIN — TRIAMCINOLONE ACETONIDE 100 MG: 40 INJECTION, SUSPENSION INTRA-ARTICULAR; INTRAMUSCULAR at 10:46

## 2023-10-10 NOTE — PROGRESS NOTES
Date: 10/10/23   Name: Raad Naqvi    Pre-Procedural Diagnosis:    Diagnosis Orders   1. Lumbar radiculopathy  XR INJ SPINE THER SUBST LUM/SAC W IMG    triamcinolone acetonide (KENALOG-40) injection 100 mg          Procedure: Lumbar Epidural Steroid Injection (MARLENA)    Precautions: [facility] Precautions spine injections: None. Patient denies any prior sensitivity to steroid, local anesthetic, contrast dye, iodine or shellfish. The procedure was discussed at length with the patient and informed consent was signed. The patient was placed in a prone position on the fluoroscopy table and the skin was prepped and draped in a routine sterile fashion. The area to be injected was anesthetized with approximately 5 cc of 1% Lidocaine. Initially a 22-gauge 3.5 inch spinal needle was carefully advanced under fluoroscopic guidance to the left L4-L5 interlaminar epidural space. At this time 0.25 cc of omnipaque administered. . Once proper placement was confirmed, 1.5 cc of sterile water and 100 mg of Kenalog were injected through the spinal needle. Fluoroscopic guidance was used intermittently over a 10-minute period to insure proper needle placement and patient safety. A hard copy of the fluoroscopic  images has been placed in the patient's chart. The patient was monitored after the procedure and discharged home without complication. A total of 2.5 cc of Kenalog were administered during this procedure.     Resume Meds:  Pt remains on asa 81 mg.    Jacinda Melendez MD  10/10/23

## 2024-02-12 ENCOUNTER — TELEPHONE (OUTPATIENT)
Dept: ORTHOPEDIC SURGERY | Age: 79
End: 2024-02-12

## 2024-02-12 NOTE — TELEPHONE ENCOUNTER
She is calling about her arm pain and wondering if Dr. Castro could help. I told her she would probably need to see one of our other doctors. Call transferred to appointments.

## 2024-03-13 NOTE — PROGRESS NOTES
Name: Melissa Wilson  YOB: 1945  Gender: female  MRN: [id number]  Date of Encounter:  3/15/2024       CHIEF COMPLAINT:     Chief Complaint   Patient presents with    Shoulder Pain     Left        SUBJECTIVE/OBJECTIVE:      HPI:    Melissa Wilson  is a 78 y.o. pleasant female who presents today for a new evaluation of her left shoulder pain.    And is a right-hand-dominant female who presents with left shoulder pain that has been present since October 2023.  Pain is to the anterior lateral shoulder and upper arm.  She recalls lifting groceries to pass him off to another person in a vehicle when she felt a pop and pain in her left arm.  Pain is typically worse when reaching for her seatbelt or any arm extension with abduction. She has issues with sleep but also has insomnia.  She does not recall any swelling or bruising at that time.  She has taken some NSAIDs with minimal relief.    Ms. Wilson attended [employer] and her  played FB with Huber Solomon at [place].      PAST HISTORY:   Past medical, surgical, family, social history and allergies reviewed by me.   Pertinent history: HTN, anxiety  Tobacco use:  reports that she quit smoking about 49 years ago. She has never used smokeless tobacco.  Diabetes: none  Hemoglobin A1C   Date Value Ref Range Status   08/06/2019 6.0 (H) 4.8 - 5.6 % Final     Comment:              Prediabetes: 5.7 - 6.4           Diabetes: >6.4           Glycemic control for adults with diabetes: <7.0       Anticoagulation: no      REVIEW OF SYSTEMS:   As noted in HPI.     PHYSICAL EXAMINATION:     Gen: Well-developed, no acute distress   HEENT: NC/AT, EOMI   Neck: Trachea midline, normal ROM   CV: Regular rhythm by palpation of distal pulse, normal capillary refill   Pulm: No respiratory distress, no stridor   Psychiatric: Well oriented, normal mood and affect.   Skin: No rashes, lesions or ulcers, normal temperature, turgor, and texture on uninvolved extremity.      ORTHO EXAM:    Left

## 2024-03-15 ENCOUNTER — OFFICE VISIT (OUTPATIENT)
Dept: ORTHOPEDIC SURGERY | Age: 79
End: 2024-03-15

## 2024-03-15 DIAGNOSIS — M25.512 CHRONIC LEFT SHOULDER PAIN: Primary | ICD-10-CM

## 2024-03-15 DIAGNOSIS — G89.29 CHRONIC LEFT SHOULDER PAIN: Primary | ICD-10-CM

## 2024-03-15 RX ORDER — METHYLPREDNISOLONE ACETATE 80 MG/ML
80 INJECTION, SUSPENSION INTRA-ARTICULAR; INTRALESIONAL; INTRAMUSCULAR; SOFT TISSUE ONCE
Status: COMPLETED | OUTPATIENT
Start: 2024-03-15 | End: 2024-03-15

## 2024-03-15 RX ADMIN — METHYLPREDNISOLONE ACETATE 80 MG: 80 INJECTION, SUSPENSION INTRA-ARTICULAR; INTRALESIONAL; INTRAMUSCULAR; SOFT TISSUE at 10:16

## 2024-03-19 ENCOUNTER — TELEPHONE (OUTPATIENT)
Dept: ORTHOPEDIC SURGERY | Age: 79
End: 2024-03-19

## 2024-03-19 NOTE — TELEPHONE ENCOUNTER
Patient has a apt for MARLENA w/ [facility] tomorrow 3/20 but wants to know if that is ok since had a lt shoulder inject with Dr KY Patricia 3/15?

## 2024-03-19 NOTE — TELEPHONE ENCOUNTER
Called patient she was walking dog and collar came off she stated she will call back, She can be scheduled in a spine injection slot as a recheck ortho and a spine injection  to follow at spine injection slot right after.

## 2024-03-20 ENCOUNTER — OFFICE VISIT (OUTPATIENT)
Dept: ORTHOPEDIC SURGERY | Age: 79
End: 2024-03-20

## 2024-03-20 ENCOUNTER — OFFICE VISIT (OUTPATIENT)
Dept: ORTHOPEDIC SURGERY | Age: 79
End: 2024-03-20
Payer: MEDICARE

## 2024-03-20 VITALS — HEIGHT: 64 IN | WEIGHT: 183 LBS | BODY MASS INDEX: 31.24 KG/M2

## 2024-03-20 DIAGNOSIS — M54.16 LUMBAR RADICULOPATHY: Primary | ICD-10-CM

## 2024-03-20 DIAGNOSIS — M48.061 SPINAL STENOSIS OF LUMBAR REGION WITHOUT NEUROGENIC CLAUDICATION: Primary | ICD-10-CM

## 2024-03-20 DIAGNOSIS — M54.16 LUMBAR RADICULOPATHY: ICD-10-CM

## 2024-03-20 DIAGNOSIS — N28.1 RENAL CYST: ICD-10-CM

## 2024-03-20 PROCEDURE — 64483 NJX AA&/STRD TFRM EPI L/S 1: CPT | Performed by: PHYSICAL MEDICINE & REHABILITATION

## 2024-03-20 PROCEDURE — 64484 NJX AA&/STRD TFRM EPI L/S EA: CPT | Performed by: PHYSICAL MEDICINE & REHABILITATION

## 2024-03-20 RX ORDER — ZOLPIDEM TARTRATE 10 MG/1
10 TABLET ORAL NIGHTLY PRN
COMMUNITY
Start: 2024-03-06

## 2024-03-20 RX ORDER — TRIAMCINOLONE ACETONIDE 40 MG/ML
160 INJECTION, SUSPENSION INTRA-ARTICULAR; INTRAMUSCULAR ONCE
Status: COMPLETED | OUTPATIENT
Start: 2024-03-20 | End: 2024-03-20

## 2024-03-20 RX ORDER — VENLAFAXINE HYDROCHLORIDE 75 MG/1
CAPSULE, EXTENDED RELEASE ORAL
COMMUNITY
Start: 2024-02-16

## 2024-03-20 RX ORDER — OMEPRAZOLE 20 MG/1
CAPSULE, DELAYED RELEASE ORAL
COMMUNITY
Start: 2024-02-08

## 2024-03-20 RX ADMIN — TRIAMCINOLONE ACETONIDE 160 MG: 40 INJECTION, SUSPENSION INTRA-ARTICULAR; INTRAMUSCULAR at 11:49

## 2024-03-20 NOTE — PROGRESS NOTES
Date:  03/20/24     HPI:  I am seeing Melissa Wilson in evalution/folowup.  I saw her most recently in the office setting in May of last year.  Full details in that note but with several month history of buttock pain, more so left than right.  Gravitating down the lateral aspect of the left leg to the foot with paresthetic symptoms.  Worse with activities better with rest.  MRI lumbar spine reveals significant spinal stenosis at L4-L5 level and she had excellent sponsor translaminar lumbar MARLENA.  Significant pain reduction for 4-5 months.  The pain has come back in similar fashion, more left buttock gravitates partially posteriorly more so than laterally into the buttock area.  No neurologic issues.    She tells me that in the interim she has been diagnosed with DVT, believes she may have had that in the past.  Is also pulmonary embolism she is on Eliquis.    There is also incidental finding of a renal cyst on MRI for which renal ultrasound revealed a complex cyst on the left which it was felt the follow-up study would be indicated.  We can pursue that today since that has not been done yet.    ROS: As above    PE: Alert and cooperative.  Good strength lower extremities.  Ambulates without assistance.  She has tenderness in the left lower buttock area.    Assessment/Plan:       PREDOMINANTLY MUSCULOSKELETAL LUMBOSACRAL  SPINE PAIN.  In the context of significant spinal stenosis, I do not see there is a gait abnormality.  Only there is a spine surgical issue.  She is aware if she develops problems with her ambulation spine surgical options might be an issue down the road.  I would repeat a spine injection, though cannot do a translaminar lumbar MARLENA because she is on Eliquis.  I do not think the prescribing party would want to come off Eliquis temporarily after she has only been on it for several months with known PE.  I can offer left L5 and S1 selective nerve root blocks and hopefully this will provide significant

## 2024-03-20 NOTE — PROGRESS NOTES
Date: 03/20/24   Name: Melissa Wilson    Pre-Procedural Diagnosis:    Diagnosis Orders   1. Lumbar radiculopathy  FL NERVE BLOCK LUMBOSACRAL 1ST    FL NERVE BLOCK LUMBOSACRAL EACH ADD    triamcinolone acetonide (KENALOG-40) injection 160 mg          Procedure: Selective Nerve Root Blocks (Transforaminal) - Multiple Level    Precautions:  [facility] Precautions spine injections: None.  Patient denies any prior sensitivity to steroid, local anesthetic, contrast dye, iodine or shellfish.    The procedure was discussed at length with the patient and informed consent was signed. The patient was placed in a prone position on the fluoroscopy table and the skin was prepped and draped in a routine sterile fashion. The areas to be injected were each anesthetized with approximately 5 cc of 1% Lidocaine. A 22-gauge 3.5 inch spinal needle was carefully advanced under fluoroscopic guidance to the left L5 transforaminal space and subsequently the left S1 transforaminal space. At this time 0.25 cc of omnipaque administered.. Once proper placement was confirmed, 2 cc of 0.25% Marcaine and 80 mg of Kenalog were injected through the spinal needle at each site.     Fluoroscopic guidance was used intermittently over a 10-minute period to insure proper needle placement and patient safety. A hard copy of the fluoroscopic  images has been placed in the patient's chart. The patient was monitored after the procedure and discharged home in stable fashion.     A total of 4 cc of Kenalog were administered during this procedure.    Resume Meds:  N/A Remains on Eliquis.    L ROBERTO MARCUS JR, MD  03/20/24

## 2024-03-25 ENCOUNTER — TELEPHONE (OUTPATIENT)
Dept: ORTHOPEDIC SURGERY | Age: 79
End: 2024-03-25

## 2024-03-25 NOTE — TELEPHONE ENCOUNTER
Spoke to patient. The SA-SD inj did not help so she wanted to ask about next steps. I told her we can see her again for a re-eval and potentially try different inj, or discuss further imaging. Scheduled for 4/11/24 at 9:45 AM.

## 2024-03-25 NOTE — TELEPHONE ENCOUNTER
LVM: if she has any questions about her shoulder injection she can either leave a message with them or I can try calling back again.

## 2024-04-23 ENCOUNTER — TELEPHONE (OUTPATIENT)
Dept: ORTHOPEDIC SURGERY | Age: 79
End: 2024-04-23

## 2024-04-23 ENCOUNTER — HOSPITAL ENCOUNTER (OUTPATIENT)
Dept: ULTRASOUND IMAGING | Age: 79
Discharge: HOME | End: 2024-04-26
Attending: PHYSICAL MEDICINE & REHABILITATION
Payer: MEDICARE

## 2024-04-23 DIAGNOSIS — N28.1 RENAL CYST: ICD-10-CM

## 2024-04-23 PROCEDURE — 76770 US EXAM ABDO BACK WALL COMP: CPT

## 2024-04-23 NOTE — TELEPHONE ENCOUNTER
Reviewed renal ultrasound results.  Issue of a renal cyst on the left that had a complex nature to it.  From initial study was felt that a follow-up study could be performed.  This study does reveal that the cyst is felt to be indeed a cyst and was felt to have less complex features today.  There is also the finding of a probable nonobstructing renal stone on the right.  The  did not feel the need further testing should be performed.  I would agree.

## 2024-06-07 NOTE — PROGRESS NOTES
Name: Melissa Wilson  YOB: 1945  Gender: female  MRN: [id number]  Date of Encounter:  6/10/2024       CHIEF COMPLAINT:     Chief Complaint   Patient presents with    Follow-up     Left Shoulder        SUBJECTIVE/OBJECTIVE:      HPI:    Patient is a 78 y.o. pleasant female who presents today for a return evaluation of her left shoulder.    Working diagnosis:   1. Chronic left shoulder pain    2. Rotator cuff tendonitis, left       LOV: 3/15/2024     Recall hx: Melissa is a right-hand-dominant female who presents with left shoulder pain that has been present since October 2023.  Pain is to the anterior lateral shoulder and upper arm.  She recalls lifting groceries to pass him off to another person in a vehicle when she felt a pop and pain in her left arm.  Pain is typically worse when reaching for her seatbelt or any arm extension with abduction. She has issues with sleep but also has insomnia.  She does not recall any swelling or bruising at that time.  She has taken some NSAIDs with minimal relief.    XR shows moderate AC joint arthritis, osteopenia. Exam consistent with rotator cuff tendinopathy.     3/15/24: SA-SD inj performed. HEP provided.   6/10/24: She did get relief with cortisone injection.  She does have continued aching in the shoulder and is hesitant to play golf which she enjoys due to pain.  She still has issues fastening her bra or reaching behind her.  Her sleep has been tolerable.  She is concerned about injuring her shoulder further doing activity.       PAST HISTORY:   Past medical, surgical, family, social history and allergies reviewed by me. Unchanged from prior visit.     REVIEW OF SYSTEMS:   As noted in HPI.     PHYSICAL EXAMINATION:     Gen: Well-developed, no acute distress   HEENT: NC/AT, EOMI   Neck: Trachea midline, normal ROM   CV: Regular rhythm by palpation of distal pulse, normal capillary refill   Pulm: No respiratory distress, no stridor   Psychiatric: Well oriented,

## 2024-06-10 ENCOUNTER — OFFICE VISIT (OUTPATIENT)
Dept: ORTHOPEDIC SURGERY | Age: 79
End: 2024-06-10
Payer: MEDICARE

## 2024-06-10 DIAGNOSIS — M75.82 ROTATOR CUFF TENDONITIS, LEFT: ICD-10-CM

## 2024-06-10 DIAGNOSIS — G89.29 CHRONIC LEFT SHOULDER PAIN: Primary | ICD-10-CM

## 2024-06-10 DIAGNOSIS — M25.512 CHRONIC LEFT SHOULDER PAIN: Primary | ICD-10-CM

## 2024-06-10 PROCEDURE — 99213 OFFICE O/P EST LOW 20 MIN: CPT | Performed by: STUDENT IN AN ORGANIZED HEALTH CARE EDUCATION/TRAINING PROGRAM

## 2024-06-10 PROCEDURE — 20610 DRAIN/INJ JOINT/BURSA W/O US: CPT | Performed by: STUDENT IN AN ORGANIZED HEALTH CARE EDUCATION/TRAINING PROGRAM

## 2024-06-10 PROCEDURE — 1090F PRES/ABSN URINE INCON ASSESS: CPT | Performed by: STUDENT IN AN ORGANIZED HEALTH CARE EDUCATION/TRAINING PROGRAM

## 2024-06-10 PROCEDURE — G8399 PT W/DXA RESULTS DOCUMENT: HCPCS | Performed by: STUDENT IN AN ORGANIZED HEALTH CARE EDUCATION/TRAINING PROGRAM

## 2024-06-10 PROCEDURE — 1123F ACP DISCUSS/DSCN MKR DOCD: CPT | Performed by: STUDENT IN AN ORGANIZED HEALTH CARE EDUCATION/TRAINING PROGRAM

## 2024-06-10 PROCEDURE — 1036F TOBACCO NON-USER: CPT | Performed by: STUDENT IN AN ORGANIZED HEALTH CARE EDUCATION/TRAINING PROGRAM

## 2024-06-10 PROCEDURE — G8428 CUR MEDS NOT DOCUMENT: HCPCS | Performed by: STUDENT IN AN ORGANIZED HEALTH CARE EDUCATION/TRAINING PROGRAM

## 2024-06-10 PROCEDURE — G8417 CALC BMI ABV UP PARAM F/U: HCPCS | Performed by: STUDENT IN AN ORGANIZED HEALTH CARE EDUCATION/TRAINING PROGRAM

## 2024-06-10 RX ORDER — METHYLPREDNISOLONE ACETATE 80 MG/ML
80 INJECTION, SUSPENSION INTRA-ARTICULAR; INTRALESIONAL; INTRAMUSCULAR; SOFT TISSUE ONCE
Status: COMPLETED | OUTPATIENT
Start: 2024-06-10 | End: 2024-06-10

## 2024-06-10 RX ADMIN — METHYLPREDNISOLONE ACETATE 80 MG: 80 INJECTION, SUSPENSION INTRA-ARTICULAR; INTRALESIONAL; INTRAMUSCULAR; SOFT TISSUE at 10:19

## 2024-06-14 ENCOUNTER — HOSPITAL ENCOUNTER (OUTPATIENT)
Dept: PHYSICAL THERAPY | Age: 79
Setting detail: RECURRING SERIES
End: 2024-06-14
Attending: STUDENT IN AN ORGANIZED HEALTH CARE EDUCATION/TRAINING PROGRAM
Payer: MEDICARE

## 2024-06-17 NOTE — THERAPY EVALUATION
Melissa Wilson  : 1945  Primary: Medicare Part A And B (Medicare)  Secondary: [employer] MEDICARE SUPP [facility] @ [address]  [facility] [address]  Phone: [phone]  Fax: [phone] Plan Frequency: 2 times a week for 8 weeks  Plan of Care/Certification Expiration Date: 24        Plan of Care/Certification Expiration Date:  Plan of Care/Certification Expiration Date: 24    Frequency/Duration: Plan Frequency: 2 times a week for 8 weeks      Time In/Out:   Time In: 1100  Time Out: 1200      PT Visit Info:    Progress Note Due Date: 24  Progress Note Counter: 1      Visit Count:  1                OUTPATIENT PHYSICAL THERAPY:             Initial Assessment 2024               Episode (left shoulder pain)         Treatment Diagnosis:     Acute pain of left shoulder  Left shoulder stiffness  Medical/Referring Diagnosis:     Chronic left shoulder pain [M25.512, G89.29]  Rotator cuff tendonitis, left [M75.82]      Referring Physician:  Rajwinder Patricia MD MD Orders:  PT Eval and Treat   Return MD Appt:  none scheduled  Date of Onset:    10/24/23  Allergies:  Cortisone  Restrictions/Precautions:    Restrictions/Precautions: None     Medications Last Reviewed:  2024     SUBJECTIVE   History of Injury/Illness (Reason for Referral):  Pt reports felling a painful pop in the left shoulder when lifting a bag of groceries up to a van window height. She states purchasing a sling that she wears for pain relief because tylenol hasn't helped. She has had an injection in the shoulder which helps until she goes to move the arm. She denies any numbness or tingling or neck pain.  Patient Stated Goal(s):  \"Be able to play golf\"  Initial Pain Level:      6/10   Post Session Pain Level:     3/10  Past Medical History/Comorbidities:   Ms. Wilson  has a past medical history of Anxiety, Arthritis, Cancer (HCC), Depression with anxiety, Diverticulosis,

## 2024-06-17 NOTE — PROGRESS NOTES
Melissa Wilson  : 1945  Primary: Medicare Part A And B (Medicare)  Secondary: [employer] HE[employer] C[employer] MEDICARE SUPP [facility] @ [address]  [facility] [address]  Phone: [phone]  Fax: [phone] Plan Frequency: 2 times a week for 8 weeks    Plan of Care/Certification Expiration Date: 24        Plan of Care/Certification Expiration Date:  Plan of Care/Certification Expiration Date: 24    Frequency/Duration:   Plan Frequency: 2 times a week for 8 weeks      Time In/Out:   Time In: 1100  Time Out: 1200      PT Visit Info:    Progress Note Due Date: 24  Progress Note Counter: 1      Visit Count:  1    OUTPATIENT PHYSICAL THERAPY:   Treatment Note 2024       Episode  (left shoulder pain)               Treatment Diagnosis:    Acute pain of left shoulder  Left shoulder stiffness  Medical/Referring Diagnosis   Chronic left shoulder pain [M25.512, G89.29]  Rotator cuff tendonitis, left [M75.82]      Referring Physician:  Rajwinder Patricia MD MD Orders:  PT Eval and Treat   Return MD Appt:  none scheduled   Date of Onset:  10/24/23  Allergies:   Cortisone  Restrictions/Precautions:   Restrictions/Precautions: None     Interventions Planned (Treatment may consist of any combination of the following):  Current Treatment Recommendations: Strengthening; ROM; Neuromuscular re-education; Manual; Pain management; Home exercise program; Modalities    See Assessment Note    Subjective Comments:   See eval  Initial Pain Level::     6/10  Post Session Pain Level:       3/10  Medications Last Reviewed:  2024  Updated Objective Findings:  See Evaluation Note from today  Treatment   THERAPEUTIC EXERCISE: (10 minutes):    Exercises to improve mobility and strength.  Required maximal verbal and tactile cues to promote proper body alignment, promote proper body posture, and technique .  Reviewed and issued HEP with chin tuck/scapular retraction posture correction,

## 2024-06-18 ENCOUNTER — HOSPITAL ENCOUNTER (OUTPATIENT)
Dept: PHYSICAL THERAPY | Age: 79
Setting detail: RECURRING SERIES
Discharge: HOME | End: 2024-06-21
Attending: STUDENT IN AN ORGANIZED HEALTH CARE EDUCATION/TRAINING PROGRAM
Payer: MEDICARE

## 2024-06-18 DIAGNOSIS — M25.512 ACUTE PAIN OF LEFT SHOULDER: Primary | ICD-10-CM

## 2024-06-18 DIAGNOSIS — M25.612 STIFFNESS OF LEFT SHOULDER, NOT ELSEWHERE CLASSIFIED: ICD-10-CM

## 2024-06-18 PROCEDURE — 97162 PT EVAL MOD COMPLEX 30 MIN: CPT

## 2024-06-18 PROCEDURE — 97110 THERAPEUTIC EXERCISES: CPT

## 2024-06-18 ASSESSMENT — PAIN SCALES - GENERAL: PAINLEVEL_OUTOF10: 6

## 2024-07-09 ENCOUNTER — HOSPITAL ENCOUNTER (OUTPATIENT)
Dept: PHYSICAL THERAPY | Age: 79
Setting detail: RECURRING SERIES
Discharge: HOME | End: 2024-07-12
Attending: STUDENT IN AN ORGANIZED HEALTH CARE EDUCATION/TRAINING PROGRAM
Payer: MEDICARE

## 2024-07-09 PROCEDURE — 97110 THERAPEUTIC EXERCISES: CPT

## 2024-07-09 ASSESSMENT — PAIN SCALES - GENERAL: PAINLEVEL_OUTOF10: 2

## 2024-07-09 NOTE — PROGRESS NOTES
Melissa Wilson  : 1945  Primary: Medicare Part A And B (Medicare)  Secondary: [employer] HE[employer] C[employer] MEDICARE SUPP [facility] @ [address]  [facility] [address]  Phone: [phone]  Fax: [phone] Plan Frequency: 2 times a week for 8 weeks    Plan of Care/Certification Expiration Date: 24        Plan of Care/Certification Expiration Date:  Plan of Care/Certification Expiration Date: 24    Frequency/Duration:   Plan Frequency: 2 times a week for 8 weeks      Time In/Out:   Time In: 1441  Time Out: 1527      PT Visit Info:    Progress Note Due Date: 24  Progress Note Counter: 1      Visit Count:  2    OUTPATIENT PHYSICAL THERAPY:   Treatment Note 2024       Episode  (left shoulder pain)               Treatment Diagnosis:    Acute pain of left shoulder  Left shoulder stiffness  Medical/Referring Diagnosis   Chronic left shoulder pain [M25.512, G89.29]  Rotator cuff tendonitis, left [M75.82]      Referring Physician:  Rajwinder Patricia MD MD Orders:  PT Eval and Treat   Return MD Appt:  none scheduled   Date of Onset:  10/24/23  Allergies:   Cortisone  Restrictions/Precautions:   Restrictions/Precautions: None     Interventions Planned (Treatment may consist of any combination of the following):  Current Treatment Recommendations: Strengthening; ROM; Neuromuscular re-education; Manual; Pain management; Home exercise program; Modalities      Subjective Comments: pt reports not being in therapy because of being out of town. Did do some of the HEP. Not wearing a sling    Initial Pain Level::     2/10  Post Session Pain Level:        0/10  Medications Last Reviewed:  2024  Updated Objective Findings:    Left shoulder  Flex: 133 deg supine  ABD: 100 deg  ER: 49 deg at side, 58 deg at 80 deg ABD  IR: 73 deg  Treatment   THERAPEUTIC EXERCISE: (40 minutes):  Reviewed HEP with chin tuck/scapular retraction posture correction, hooklying shoulder ER cane

## 2024-07-12 ENCOUNTER — HOSPITAL ENCOUNTER (OUTPATIENT)
Dept: PHYSICAL THERAPY | Age: 79
Setting detail: RECURRING SERIES
Discharge: HOME | End: 2024-07-15
Attending: STUDENT IN AN ORGANIZED HEALTH CARE EDUCATION/TRAINING PROGRAM
Payer: MEDICARE

## 2024-07-12 PROCEDURE — 97110 THERAPEUTIC EXERCISES: CPT

## 2024-07-12 ASSESSMENT — PAIN SCALES - GENERAL: PAINLEVEL_OUTOF10: 1

## 2024-07-12 NOTE — PROGRESS NOTES
Melissa Montoyaer  : 1945  Primary: Medicare Part A And B (Medicare)  Secondary: [employer] MEDICARE SUPP [facility] @ [address]  [facility] [address]  Phone: [phone]  Fax: [phone] Plan Frequency: 2 times a week for 8 weeks    Plan of Care/Certification Expiration Date: 24        Plan of Care/Certification Expiration Date:  Plan of Care/Certification Expiration Date: 24    Frequency/Duration:   Plan Frequency: 2 times a week for 8 weeks      Time In/Out:   Time In: 1430  Time Out: 1520      PT Visit Info:    Progress Note Due Date: 24  Progress Note Counter: 1      Visit Count:  3    OUTPATIENT PHYSICAL THERAPY:   Treatment Note 2024       Episode  (left shoulder pain)               Treatment Diagnosis:    Acute pain of left shoulder  Left shoulder stiffness  Medical/Referring Diagnosis   Chronic left shoulder pain [M25.512, G89.29]  Rotator cuff tendonitis, left [M75.82]      Referring Physician:  Rajwinder Patricia MD MD Orders:  PT Eval and Treat   Return MD Appt:  none scheduled   Date of Onset:  10/24/23  Allergies:   Cortisone  Restrictions/Precautions:   Restrictions/Precautions: None     Interventions Planned (Treatment may consist of any combination of the following):  Current Treatment Recommendations: Strengthening; ROM; Neuromuscular re-education; Manual; Pain management; Home exercise program; Modalities      Subjective Comments: pt reports left shoulder is sore from being in the pool doing her own exercises like jumping jacks and pulling out to the side. Have not done the HEP since Tuesday    Initial Pain Level::     1/10  Post Session Pain Level:        0/10  Medications Last Reviewed:  2024  Updated Objective Findings:    Shrugging with reaching  Treatment   THERAPEUTIC EXERCISE: (44   minutes):  Grade 4-- to 4- physiological mobilizations left shoulder ER, IR and flex for ROM prior to exercises. Grid exercises

## 2024-07-22 ENCOUNTER — HOSPITAL ENCOUNTER (OUTPATIENT)
Dept: PHYSICAL THERAPY | Age: 79
Setting detail: RECURRING SERIES
Discharge: HOME | End: 2024-07-25
Attending: STUDENT IN AN ORGANIZED HEALTH CARE EDUCATION/TRAINING PROGRAM
Payer: MEDICARE

## 2024-07-22 PROCEDURE — 97110 THERAPEUTIC EXERCISES: CPT

## 2024-07-22 NOTE — PROGRESS NOTES
Melissa Wilson  : 1945  Primary: Medicare Part A And B (Medicare)  Secondary: [employer] HE[employer] C[employer] MEDICARE SUPP [facility] @ [address]  [facility] [address]  Phone: [phone]  Fax: [phone] Plan Frequency: 2 times a week for 8 weeks    Plan of Care/Certification Expiration Date: 24        Plan of Care/Certification Expiration Date:  Plan of Care/Certification Expiration Date: 24    Frequency/Duration:   Plan Frequency: 2 times a week for 8 weeks      Time In/Out:   Time In: 1430  Time Out: 1519      PT Visit Info:    Progress Note Due Date: 24  Progress Note Counter: 1      Visit Count:  4    OUTPATIENT PHYSICAL THERAPY:   Treatment Note 2024       Episode  (left shoulder pain)               Treatment Diagnosis:    Acute pain of left shoulder  Left shoulder stiffness  Medical/Referring Diagnosis   Chronic left shoulder pain [M25.512, G89.29]  Rotator cuff tendonitis, left [M75.82]      Referring Physician:  Rajwinder Patricia MD MD Orders:  PT Eval and Treat   Return MD Appt:  none scheduled   Date of Onset:  10/24/23  Allergies:   Cortisone  Restrictions/Precautions:   Restrictions/Precautions: None     Interventions Planned (Treatment may consist of any combination of the following):  Current Treatment Recommendations: Strengthening; ROM; Neuromuscular re-education; Manual; Pain management; Home exercise program; Modalities      Subjective Comments: pt reports left shoulder hurts up to a 9/10 when I reach over head, to the side and hooking the seat belt    Initial Pain Level::     5/10  Post Session Pain Level:        0/10- It's doing ok  Medications Last Reviewed:  2024  Updated Objective Findings:      Treatment   THERAPEUTIC EXERCISE: (45   minutes):  Grade 4-- to 4- physiological mobilizations left shoulder ER, IR and flex for ROM prior to exercises. Reviewed scapular positioning down and retracted for reaching and posture

## 2024-07-26 ENCOUNTER — HOSPITAL ENCOUNTER (OUTPATIENT)
Dept: PHYSICAL THERAPY | Age: 79
Setting detail: RECURRING SERIES
Discharge: HOME | End: 2024-07-29
Attending: STUDENT IN AN ORGANIZED HEALTH CARE EDUCATION/TRAINING PROGRAM
Payer: MEDICARE

## 2024-07-26 PROCEDURE — 97110 THERAPEUTIC EXERCISES: CPT

## 2024-07-26 ASSESSMENT — PAIN SCALES - GENERAL: PAINLEVEL_OUTOF10: 2

## 2024-07-26 NOTE — PROGRESS NOTES
Melissa Montoyaer  : 1945  Primary: Medicare Part A And B (Medicare)  Secondary: [employer] HE[employer] C[employer] MEDICARE SUPP [facility] @ [address]  [facility] [address]  Phone: [phone]  Fax: [phone] Plan Frequency: 2 times a week for 8 weeks    Plan of Care/Certification Expiration Date: 24        Plan of Care/Certification Expiration Date:  Plan of Care/Certification Expiration Date: 24    Frequency/Duration:   Plan Frequency: 2 times a week for 8 weeks      Time In/Out:   Time In: 1200  Time Out: 1231      PT Visit Info:    Progress Note Due Date: 24  Progress Note Counter: 1      Visit Count:  5    OUTPATIENT PHYSICAL THERAPY:   Treatment Note 2024       Episode  (left shoulder pain)               Treatment Diagnosis:    Acute pain of left shoulder  Left shoulder stiffness  Medical/Referring Diagnosis   Chronic left shoulder pain [M25.512, G89.29]  Rotator cuff tendonitis, left [M75.82]      Referring Physician:  Rajwinder Patricia MD MD Orders:  PT Eval and Treat   Return MD Appt:  none scheduled   Date of Onset:  10/24/23  Allergies:   Cortisone  Restrictions/Precautions:   Restrictions/Precautions: None     Interventions Planned (Treatment may consist of any combination of the following):  Current Treatment Recommendations: Strengthening; ROM; Neuromuscular re-education; Manual; Pain management; Home exercise program; Modalities      Subjective Comments: pt reports the shoulder isn't bad today. Able to play golf    Initial Pain Level::     2/10  Post Session Pain Level:        1/10  Medications Last Reviewed:  2024  Updated Objective Findings:    Stiff and painful ER  Treatment   THERAPEUTIC EXERCISE: (31   minutes):  Grade 4-- to 4- to 4 physiological mobilizations left shoulder ER along ABD range and flex for ROM prior to exercises. Reviewed scapular positioning down and retracted for reaching and during exercises. Grid exercises to

## 2024-07-29 ENCOUNTER — HOSPITAL ENCOUNTER (OUTPATIENT)
Dept: PHYSICAL THERAPY | Age: 79
Setting detail: RECURRING SERIES
Discharge: HOME | End: 2024-08-01
Attending: STUDENT IN AN ORGANIZED HEALTH CARE EDUCATION/TRAINING PROGRAM
Payer: MEDICARE

## 2024-07-29 PROCEDURE — 97110 THERAPEUTIC EXERCISES: CPT

## 2024-07-29 ASSESSMENT — PAIN SCALES - GENERAL: PAINLEVEL_OUTOF10: 0

## 2024-07-29 NOTE — PROGRESS NOTES
Melissa Montoyaer  : 1945  Primary: Medicare Part A And B (Medicare)  Secondary: [employer] HE[employer] C[employer] MEDICARE SUPP [facility] @ [address]  [place] [address]  Phone: [phone]  Fax: [phone] Plan Frequency: 2 times a week for 8 weeks    Plan of Care/Certification Expiration Date: 24        Plan of Care/Certification Expiration Date:  Plan of Care/Certification Expiration Date: 24    Frequency/Duration:   Plan Frequency: 2 times a week for 8 weeks      Time In/Out:   Time In: 1347  Time Out: 1433      PT Visit Info:    Progress Note Due Date: 24  Progress Note Counter: 1      Visit Count:  6    OUTPATIENT PHYSICAL THERAPY:   Treatment Note 2024       Episode  (left shoulder pain)               Treatment Diagnosis:    Acute pain of left shoulder  Left shoulder stiffness  Medical/Referring Diagnosis   Chronic left shoulder pain [M25.512, G89.29]  Rotator cuff tendonitis, left [M75.82]      Referring Physician:  Rajwinder Patricia MD MD Orders:  PT Eval and Treat   Return MD Appt:  none scheduled   Date of Onset:  10/24/23  Allergies:   Cortisone  Restrictions/Precautions:   Restrictions/Precautions: None     Interventions Planned (Treatment may consist of any combination of the following):  Current Treatment Recommendations: Strengthening; ROM; Neuromuscular re-education; Manual; Pain management; Home exercise program; Modalities      Subjective Comments: pt reports not hurting too much but trouble moving it ( demonstrating ER)    Initial Pain Level::     0/10  Post Session Pain Level:        /10  Medications Last Reviewed:  2024  Updated Objective Findings:    Trunk flexed at waist upon arrival  Treatment   THERAPEUTIC EXERCISE: (  42    minutes): instructed pt on hooklying LTR, bridging and pelvic tilts with BUE in midtrap position due to shoulder tightness Grade 4-- to 4- to 4 physiological mobilizations left shoulder ER along ABD range

## 2024-08-06 ENCOUNTER — HOSPITAL ENCOUNTER (OUTPATIENT)
Dept: PHYSICAL THERAPY | Age: 79
Setting detail: RECURRING SERIES
Discharge: HOME | End: 2024-08-09
Attending: STUDENT IN AN ORGANIZED HEALTH CARE EDUCATION/TRAINING PROGRAM
Payer: MEDICARE

## 2024-08-06 PROCEDURE — 97110 THERAPEUTIC EXERCISES: CPT

## 2024-08-06 ASSESSMENT — PAIN SCALES - GENERAL: PAINLEVEL_OUTOF10: 1

## 2024-08-06 NOTE — PROGRESS NOTES
Melissa Montoyaer  : 1945  Primary: Medicare Part A And B (Medicare)  Secondary: [employer] HE[employer] C[employer] MEDICARE SUPP [facility] @ [address]  [facility] [address]  Phone: [phone]  Fax: [phone] Plan Frequency: 2 times a week for 8 weeks    Plan of Care/Certification Expiration Date: 24        Plan of Care/Certification Expiration Date:  Plan of Care/Certification Expiration Date: 24    Frequency/Duration:   Plan Frequency: 2 times a week for 8 weeks      Time In/Out:   Time In: 1300  Time Out: 1345      PT Visit Info:    Progress Note Due Date: 24  Progress Note Counter: 1      Visit Count:  7    OUTPATIENT PHYSICAL THERAPY:   Treatment Note 2024       Episode  (left shoulder pain)               Treatment Diagnosis:    Acute pain of left shoulder  Left shoulder stiffness  Medical/Referring Diagnosis   Chronic left shoulder pain [M25.512, G89.29]  Rotator cuff tendonitis, left [M75.82]      Referring Physician:  Rajwinder Patricia MD MD Orders:  PT Eval and Treat   Return MD Appt:  none scheduled   Date of Onset:  10/24/23  Allergies:   Cortisone  Restrictions/Precautions:   Restrictions/Precautions: None     Interventions Planned (Treatment may consist of any combination of the following):  Current Treatment Recommendations: Strengthening; ROM; Neuromuscular re-education; Manual; Pain management; Home exercise program; Modalities      Subjective Comments: pt reports no new complaints    Initial Pain Level::     1/10  Post Session Pain Level:       0/10  Medications Last Reviewed:  2024  Updated Objective Findings:      Treatment   THERAPEUTIC EXERCISE: (  42    minutes): Reviewed hooklying LTR, bridging and pelvic tilts.  Grade 4-- to 4- to 4 physiological mobilizations left shoulder ER along ABD range and flex, AIS with gentle over pressure and pt hold at end ROM ER for ROM prior to strengthening.  Grid exercises to improve mobility and

## 2024-08-09 ENCOUNTER — HOSPITAL ENCOUNTER (OUTPATIENT)
Dept: PHYSICAL THERAPY | Age: 79
Setting detail: RECURRING SERIES
Discharge: HOME | End: 2024-08-12
Attending: STUDENT IN AN ORGANIZED HEALTH CARE EDUCATION/TRAINING PROGRAM
Payer: MEDICARE

## 2024-08-09 PROCEDURE — 97110 THERAPEUTIC EXERCISES: CPT

## 2024-08-09 ASSESSMENT — PAIN SCALES - GENERAL: PAINLEVEL_OUTOF10: 5

## 2024-08-09 NOTE — PROGRESS NOTES
Melissa Wilson  : 1945  Primary: Medicare Part A And B (Medicare)  Secondary: [employer] HE[employer] C[employer] MEDICARE SUPP [facility] @ [address]  [facility] [address]  Phone: [phone]  Fax: [phone] Plan Frequency: 2 times a week for 8 weeks    Plan of Care/Certification Expiration Date: 24        Plan of Care/Certification Expiration Date:  Plan of Care/Certification Expiration Date: 24    Frequency/Duration:   Plan Frequency: 2 times a week for 8 weeks      Time In/Out:   Time In: 0900  Time Out: 1000      PT Visit Info:    Progress Note Due Date: 24  Progress Note Counter: 1      Visit Count:  8    OUTPATIENT PHYSICAL THERAPY:   Treatment Note 2024       Episode  (left shoulder pain)               Treatment Diagnosis:    Acute pain of left shoulder  Left shoulder stiffness  Medical/Referring Diagnosis   Chronic left shoulder pain [M25.512, G89.29]  Rotator cuff tendonitis, left [M75.82]      Referring Physician:  Rajwinder Patricia MD MD Orders:  PT Eval and Treat   Return MD Appt:  none scheduled   Date of Onset:  10/24/23  Allergies:   Cortisone  Restrictions/Precautions:   Restrictions/Precautions: None     Interventions Planned (Treatment may consist of any combination of the following):  Current Treatment Recommendations: Strengthening; ROM; Neuromuscular re-education; Manual; Pain management; Home exercise program; Modalities      Subjective Comments: 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 Stretched the arm some but didn't do anything for it.    Initial Pain Level::     5/10  Post Session Pain Level:       2/10  Medications Last Reviewed:  2024  Updated Objective Findings:    ER 62 deg  Treatment   THERAPEUTIC EXERCISE: ( 40    minutes):  Grade 4-- to 4- to 4 physiological mobilizations left shoulder ER along ABD range and flex. AIS with gentle over pressure and pt hold at end ROM ER at 60 deg to 90 deg ABD

## 2024-08-12 ENCOUNTER — HOSPITAL ENCOUNTER (OUTPATIENT)
Dept: PHYSICAL THERAPY | Age: 79
Setting detail: RECURRING SERIES
Discharge: HOME | End: 2024-08-15
Attending: STUDENT IN AN ORGANIZED HEALTH CARE EDUCATION/TRAINING PROGRAM
Payer: MEDICARE

## 2024-08-12 PROCEDURE — 97110 THERAPEUTIC EXERCISES: CPT

## 2024-08-12 ASSESSMENT — PAIN SCALES - GENERAL: PAINLEVEL_OUTOF10: 4

## 2024-08-12 NOTE — PROGRESS NOTES
Melissa Wilson  : 1945  Primary: Medicare Part A And B (Medicare)  Secondary: [employer] HE[employer] C[employer] MEDICARE SUPP [facility] @ [address]  [facility] [address]  Phone: [phone]  Fax: [phone] Plan Frequency: 2 times a week for 8 weeks    Plan of Care/Certification Expiration Date: 24        Plan of Care/Certification Expiration Date:  Plan of Care/Certification Expiration Date: 24    Frequency/Duration:   Plan Frequency: 2 times a week for 8 weeks      Time In/Out:   Time In: 1355  Time Out: 1431      PT Visit Info:    Progress Note Due Date: 24  Progress Note Counter: 1      Visit Count:  9    OUTPATIENT PHYSICAL THERAPY:   Treatment Note 2024       Episode  (left shoulder pain)               Treatment Diagnosis:    Acute pain of left shoulder  Left shoulder stiffness  Medical/Referring Diagnosis   Chronic left shoulder pain [M25.512, G89.29]  Rotator cuff tendonitis, left [M75.82]      Referring Physician:  Rajwinder Patricia MD MD Orders:  PT Eval and Treat   Return MD Appt:  none scheduled   Date of Onset:  10/24/23  Allergies:   Cortisone  Restrictions/Precautions:   Restrictions/Precautions: None     Interventions Planned (Treatment may consist of any combination of the following):  Current Treatment Recommendations: Strengthening; ROM; Neuromuscular re-education; Manual; Pain management; Home exercise program; Modalities      Subjective Comments: pt reports the shoulder is hurting, like everything else from playing golf. Did the HEP. I want to continue after my eye surgery    Initial Pain Level::     4/10  Post Session Pain Level:       3/10  Medications Last Reviewed:  2024  Updated Objective Findings:      Treatment   THERAPEUTIC EXERCISE: ( 36   minutes):  Grade 4-- to 4- to 4 physiological mobilizations left shoulder ER along ABD range and flex. AIS with gentle over pressure and pt hold at end ROM ER at 75 deg ABD to 90 deg

## 2024-08-20 ENCOUNTER — HOSPITAL ENCOUNTER (OUTPATIENT)
Dept: PHYSICAL THERAPY | Age: 79
Setting detail: RECURRING SERIES
Discharge: HOME | End: 2024-08-23
Attending: STUDENT IN AN ORGANIZED HEALTH CARE EDUCATION/TRAINING PROGRAM
Payer: MEDICARE

## 2024-08-20 PROCEDURE — 97110 THERAPEUTIC EXERCISES: CPT

## 2024-08-20 ASSESSMENT — PAIN SCALES - GENERAL: PAINLEVEL_OUTOF10: 2

## 2024-08-20 NOTE — THERAPY RECERTIFICATION
more visits since the shoulder tightened up.     Initial Pain Level:      2/10   Post Session Pain Level:     1/10    Previous Treatment Approaches:  Injection by orthopedist  Self purchase of arm sling     OBJECTIVE   Observations:  pt presents with flexed trunk a the waist and patient looking at the floor. Displays forward head and scapular protraction that corrects with cueing. Pt tends to shrug with trying to reach into flexion, ABD, horizontal ABD.    AROM:  Left shoulder:  Flexion:  115 deg- most of the motion coming from the scapula  ABD: 90 deg  ER: 55 deg  IR: to left sacral base    STRENGTH: ( within available range)  Left shoulder:  Flexion: 4/5- fatigues quickly  ABD: 4-/5  ER: 4-/5 at side, 3+ at 90/90  IR:  4/5, 3+/5 subscap    Palpation:  tender to palpate over the left biceps tendon, subacromial region, left subscap muscle. Firm end feel left shoulder ER  ASSESSMENT    Assessment:  Mrs Wilson returns to therapy after being out for eye surgery, with complaints of returning left shoulder stiffness pain.  Although improved from evaluation, she presents with left shoulder weakness, ER stiffness,and altered posture that has gotten worse since having the eye surgery. Her left shoulder joint arthritis, post mastectomy tightness and scapular dyskinesia are likely contributing to her shoulder pain. She needs to have the shoulder mobilized with review of her HEP before being ready for discharge.    PLAN   Effective Dates: 6/18/2024 TO Plan of Care/Certification Expiration Date: 09/03/24     Frequency/Duration: Plan Frequency: 1 to 2 times a week for 2 weeks      Interventions Planned (Treatment may consist of any combination of the following):    Current Treatment Recommendations: Strengthening; ROM; Home exercise program      Goals: (Goals have been discussed and agreed upon with patient.)  Short-Term Functional Goals: Time Frame: 2 weeks  Pt will be independent with HEP and posture correction- not fully

## 2024-08-20 NOTE — PROGRESS NOTES
HEP: chin tuck/scapular retraction posture correction, hooklying shoulder ER cane stretch, standing tband ER, tband IR, propped mid trap, supine flexion with wand. Emphasis on scapular depression. Upgraded  hooklying LTR, bridging and pelvic tilts with BUE in midtrap position, supine/hooklying ER stretch using pillow, hookling midtrap to ER90/90 slide      Date:  8/20/24   Activity/Exercise Parameters   Scapular retraction Posture correction for exercises   Mid trap Propped 2x10   Low trap    ER Side 3# 2x10,    Supine 90/90 AROM ER/IR stretch with 10 second hold at end range     flexion supine  with golf club 2x10  Emphasis on scapular depression and no pillow for chin tuck position               Modlaities (  minutes)     Treatment/Session Summary:    Treatment Assessment: pt needs to work on the HEP for strength and stretching with emphasis on end range ER motion gained with treatment.     Communication/Consultation:  Therapy Evaluation sent to referring provider  Equipment provided today:  HEP  Recommendations/Intent for next treatment session: Next visit will focus on ROM, scapular placement and muscle strengthening for HEP review    >Total Treatment Billable Duration:  39  minutes   Time In: 1305  Time Out: 1350    Kesha Mcmanus PT         Charge Capture  Events  [facility] Portal  Appt Desk  Attendance Report     Future Appointments   Date Time Provider Department Center   8/23/2024  9:45 AM Kesha Mcmanus PT [facility]   8/27/2024  1:45 PM Kesha Mcmanus PT [facility]   8/30/2024 11:15 AM Kesha Mcmanus PT [facility]

## 2024-08-23 ENCOUNTER — HOSPITAL ENCOUNTER (OUTPATIENT)
Dept: PHYSICAL THERAPY | Age: 79
Setting detail: RECURRING SERIES
Discharge: HOME | End: 2024-08-26
Attending: STUDENT IN AN ORGANIZED HEALTH CARE EDUCATION/TRAINING PROGRAM
Payer: MEDICARE

## 2024-08-27 ENCOUNTER — HOSPITAL ENCOUNTER (OUTPATIENT)
Dept: PHYSICAL THERAPY | Age: 79
Setting detail: RECURRING SERIES
Discharge: HOME | End: 2024-08-30
Attending: STUDENT IN AN ORGANIZED HEALTH CARE EDUCATION/TRAINING PROGRAM
Payer: MEDICARE

## 2024-08-27 PROCEDURE — 97110 THERAPEUTIC EXERCISES: CPT

## 2024-08-27 ASSESSMENT — PAIN SCALES - GENERAL: PAINLEVEL_OUTOF10: 1

## 2024-08-27 NOTE — PROGRESS NOTES
Melissa Wilson  : 1945  Primary: Medicare Part A And B (Medicare)  Secondary: [employer] HE[employer] C[employer] MEDICARE SUPP [facility] @ [address]  [facility] [address]  Phone: [phone]  Fax: [phone]       Plan of Care/Certification Expiration Date:     Frequency/Duration:     Time In/Out:   Time In: 1348  Time Out: 1430      PT Visit Info:    Progress Note Due Date: 24  Total # of Visits to Date: 10  Progress Note Counter: 1      Visit Count:  11    OUTPATIENT PHYSICAL THERAPY:   Treatment Note 2024       Episode  (left shoulder pain)               Treatment Diagnosis:    Acute pain of left shoulder  Left shoulder stiffness  Medical/Referring Diagnosis   Chronic left shoulder pain [M25.512, G89.29]  Rotator cuff tendonitis, left [M75.82]      Referring Physician:  Rajwinder Patricia MD MD Orders:  PT Eval and Treat   Return MD Appt:  none scheduled   Date of Onset:  10/24/23  Allergies:   Cortisone  Restrictions/Precautions:   Restrictions/Precautions: None     Interventions Planned (Treatment may consist of any combination of the following):  Current Treatment Recommendations: Strengthening; ROM; Home exercise program      Subjective Comments: pt reports doing the HEP    Initial Pain Level::     1/10  Post Session Pain Level:       1/10  Medications Last Reviewed:  2024  Updated Objective Findings:    ER: 48 deg at 85 deg ABD before treatment , 61 deg after treatment  Treatment   THERAPEUTIC EXERCISE: ( 42  minutes):  Grade 4-- to 4- to 4 physiological mobilizations left shoulder ER along ABD range and flex with respect to pain. AIS with gentle over pressure and pt hold at end ROM ER at 75 deg ABD to 90 deg ABD for ROM prior to strengthening.  Grid exercises to improve mobility and strength with emphasis on scapular placement.  Required minimal verbal and tactile cues to promote proper body alignment, promote proper body posture, and technique      HEP:  chin tuck/scapular retraction posture correction, hooklying shoulder ER cane stretch, standing tband ER, tband IR, propped mid trap, supine flexion with wand. Emphasis on scapular depression. Upgraded  hooklying LTR, bridging and pelvic tilts with BUE in midtrap position, supine/hooklying ER stretch using pillow, hookling midtrap to ER90/90 slide      Date:  8/27/24   Activity/Exercise Parameters   Scapular retraction Posture correction for exercises   Mid trap Propped 2x10   Low trap Side 1# 2x10   ER Side 3# 2x10,    Seated scaption 2# 2x10,    Supine 90/90 AROM ER/IR stretch with 10 second hold at end range x 5 reps     flexion supine  with golf club 2# x10,  Emphasis on scapular depression and no pillow for chin tuck position,    Chest [ress 3# x10               Modlaities (  minutes)     Treatment/Session Summary:    Treatment Assessment: pt needs to keep strength gained at end ROM     Communication/Consultation:  Therapy Evaluation sent to referring provider  Equipment provided today:  HEP  Recommendations/Intent for next treatment session: Next visit will focus on ROM, scapular placement and muscle strengthening for HEP review    >Total Treatment Billable Duration:   42 minutes   Time In: 1348  Time Out: 1430    Kesha Mcmanus, PT         Charge Capture  Events  [employer] Portal  Appt Desk  Attendance Report     Future Appointments   Date Time Provider Department Center   8/30/2024 11:15 AM Kesha Mcmanus, PT [facility]

## 2024-08-30 ENCOUNTER — HOSPITAL ENCOUNTER (OUTPATIENT)
Dept: PHYSICAL THERAPY | Age: 79
Setting detail: RECURRING SERIES
End: 2024-08-30
Attending: STUDENT IN AN ORGANIZED HEALTH CARE EDUCATION/TRAINING PROGRAM
Payer: MEDICARE

## 2024-08-30 NOTE — PROGRESS NOTES
Melissa Wilson  : 1945  Primary: Medicare Part A And B (Medicare)  Secondary: [employer] HE[employer] C[employer] MEDICARE SUPP [facility] @ [address]  [facility] [address]  Phone: [phone]  Fax: [phone]       Plan of Care/Certification Expiration Date:     Frequency/Duration:     Time In/Out:          PT Visit Info:    Progress Note Due Date: 24  Total # of Visits to Date: 10  Progress Note Counter: 1      Visit Count:  12    OUTPATIENT PHYSICAL THERAPY:   Treatment Note 2024       Episode  (left shoulder pain)               Treatment Diagnosis:    Acute pain of left shoulder  Left shoulder stiffness  Medical/Referring Diagnosis   Chronic left shoulder pain [M25.512, G89.29]  Rotator cuff tendonitis, left [M75.82]      Referring Physician:  Rajwinder Patricia MD MD Orders:  PT Eval and Treat   Return MD Appt:  none scheduled   Date of Onset:  10/24/23  Allergies:   Cortisone  Restrictions/Precautions:   Restrictions/Precautions: None     Interventions Planned (Treatment may consist of any combination of the following):  Current Treatment Recommendations: Strengthening; ROM; Home exercise program      Subjective Comments: pt reports doing the HEP    Initial Pain Level::      /10  Post Session Pain Level:        /10  Medications Last Reviewed:  2024  Updated Objective Findings:    ER: 48 deg at 85 deg ABD before treatment , 61 deg after treatment  Treatment   THERAPEUTIC EXERCISE: ( 42  minutes):  Grade 4-- to 4- to 4 physiological mobilizations left shoulder ER along ABD range and flex with respect to pain. AIS with gentle over pressure and pt hold at end ROM ER at 75 deg ABD to 90 deg ABD for ROM prior to strengthening.  Grid exercises to improve mobility and strength with emphasis on scapular placement.  Required minimal verbal and tactile cues to promote proper body alignment, promote proper body posture, and technique      HEP: chin tuck/scapular retraction

## 2024-09-16 ENCOUNTER — TELEPHONE (OUTPATIENT)
Dept: ORTHOPEDIC SURGERY | Age: 79
End: 2024-09-16

## 2024-09-16 DIAGNOSIS — M54.16 LUMBAR RADICULOPATHY: Primary | ICD-10-CM

## 2024-09-18 ENCOUNTER — OFFICE VISIT (OUTPATIENT)
Dept: ORTHOPEDIC SURGERY | Age: 79
End: 2024-09-18
Payer: MEDICARE

## 2024-09-18 DIAGNOSIS — M54.16 LUMBAR RADICULOPATHY: Primary | ICD-10-CM

## 2024-09-18 PROCEDURE — 64484 NJX AA&/STRD TFRM EPI L/S EA: CPT | Performed by: PHYSICAL MEDICINE & REHABILITATION

## 2024-09-18 PROCEDURE — 64483 NJX AA&/STRD TFRM EPI L/S 1: CPT | Performed by: PHYSICAL MEDICINE & REHABILITATION

## 2024-09-18 RX ORDER — TRIAMCINOLONE ACETONIDE 40 MG/ML
160 INJECTION, SUSPENSION INTRA-ARTICULAR; INTRAMUSCULAR ONCE
Status: COMPLETED | OUTPATIENT
Start: 2024-09-18 | End: 2024-09-18

## 2024-09-18 RX ADMIN — TRIAMCINOLONE ACETONIDE 160 MG: 40 INJECTION, SUSPENSION INTRA-ARTICULAR; INTRAMUSCULAR at 11:00

## (undated) DEVICE — CONNECTOR TBNG OD5-7MM O2 END DISP

## (undated) DEVICE — SYR 3ML LL TIP 1/10ML GRAD --

## (undated) DEVICE — SYR 5ML 1/5 GRAD LL NSAF LF --

## (undated) DEVICE — CANNULA NSL ORAL AD FOR CAPNOFLEX CO2 O2 AIRLFE

## (undated) DEVICE — NDL PRT INJ NSAF BLNT 18GX1.5 --

## (undated) DEVICE — KENDALL RADIOLUCENT FOAM MONITORING ELECTRODE RECTANGULAR SHAPE: Brand: KENDALL